# Patient Record
Sex: MALE | Race: WHITE
[De-identification: names, ages, dates, MRNs, and addresses within clinical notes are randomized per-mention and may not be internally consistent; named-entity substitution may affect disease eponyms.]

---

## 2017-05-20 ENCOUNTER — HOSPITAL ENCOUNTER (EMERGENCY)
Dept: HOSPITAL 18 - NAV ERS | Age: 52
Discharge: HOME | End: 2017-05-20
Payer: MEDICARE

## 2017-05-20 DIAGNOSIS — I50.9: ICD-10-CM

## 2017-05-20 DIAGNOSIS — F12.10: ICD-10-CM

## 2017-05-20 DIAGNOSIS — K57.90: ICD-10-CM

## 2017-05-20 DIAGNOSIS — Z86.19: ICD-10-CM

## 2017-05-20 DIAGNOSIS — E86.0: Primary | ICD-10-CM

## 2017-05-20 DIAGNOSIS — F15.10: ICD-10-CM

## 2017-05-20 DIAGNOSIS — F32.9: ICD-10-CM

## 2017-05-20 DIAGNOSIS — F41.9: ICD-10-CM

## 2017-05-20 DIAGNOSIS — I11.0: ICD-10-CM

## 2017-05-20 LAB
ALBUMIN SERPL BCG-MCNC: 3.6 G/DL (ref 3.5–5)
ALP SERPL-CCNC: 87 U/L (ref 40–150)
ALT SERPL W P-5'-P-CCNC: 66 U/L (ref 8–55)
ANION GAP SERPL CALC-SCNC: 16 MMOL/L (ref 10–20)
AST SERPL-CCNC: 101 U/L (ref 5–34)
BASOPHILS # BLD AUTO: 0.1 THOU/UL (ref 0–0.2)
BASOPHILS NFR BLD AUTO: 1.8 % (ref 0–1)
BILIRUB SERPL-MCNC: 1.9 MG/DL (ref 0.2–1.2)
BUN SERPL-MCNC: 23 MG/DL (ref 8.4–25.7)
CALCIUM SERPL-MCNC: 9.3 MG/DL (ref 7.8–10.44)
CHLORIDE SERPL-SCNC: 104 MMOL/L (ref 98–107)
CO2 SERPL-SCNC: 23 MMOL/L (ref 22–29)
CREAT CL PREDICTED SERPL C-G-VRATE: 0 ML/MIN (ref 70–130)
DRUG SCREEN CUTOFF: (no result)
EOSINOPHIL # BLD AUTO: 0.1 THOU/UL (ref 0–0.7)
EOSINOPHIL NFR BLD AUTO: 2 % (ref 0–10)
GLOBULIN SER CALC-MCNC: 3.6 G/DL (ref 2.4–3.5)
GLUCOSE SERPL-MCNC: 107 MG/DL (ref 70–105)
GLUCOSE UR STRIP-MCNC: 100 MG/DL
HGB BLD-MCNC: 19.2 G/DL (ref 14–18)
LYMPHOCYTES # BLD AUTO: 1.7 THOU/UL (ref 1.2–3.4)
LYMPHOCYTES NFR BLD AUTO: 24.1 % (ref 21–51)
MAGNESIUM SERPL-MCNC: 2 MG/DL (ref 1.6–2.6)
MCH RBC QN AUTO: 30.9 PG (ref 27–31)
MCV RBC AUTO: 92.7 FL (ref 80–94)
MEDTOX CONTROL LINE VALID?: (no result)
MONOCYTES # BLD AUTO: 0.6 THOU/UL (ref 0.11–0.59)
MONOCYTES NFR BLD AUTO: 8.7 % (ref 0–10)
NEUTROPHILS # BLD AUTO: 4.6 THOU/UL (ref 1.4–6.5)
NEUTROPHILS NFR BLD AUTO: 63.5 % (ref 42–75)
PLATELET # BLD AUTO: 114 THOU/UL (ref 130–400)
POTASSIUM SERPL-SCNC: 3.9 MMOL/L (ref 3.5–5.1)
PROT UR STRIP.AUTO-MCNC: 100 MG/DL
RBC # BLD AUTO: 6.23 MILL/UL (ref 4.7–6.1)
RBC UR QL AUTO: (no result) HPF (ref 0–3)
SODIUM SERPL-SCNC: 139 MMOL/L (ref 136–145)
SP GR UR STRIP: 1.02 (ref 1–1.03)
WBC # BLD AUTO: 7.2 THOU/UL (ref 4.8–10.8)
WBC UR QL AUTO: (no result) HPF (ref 0–3)

## 2017-05-20 PROCEDURE — 80053 COMPREHEN METABOLIC PANEL: CPT

## 2017-05-20 PROCEDURE — 81015 MICROSCOPIC EXAM OF URINE: CPT

## 2017-05-20 PROCEDURE — 71010: CPT

## 2017-05-20 PROCEDURE — 83880 ASSAY OF NATRIURETIC PEPTIDE: CPT

## 2017-05-20 PROCEDURE — 81003 URINALYSIS AUTO W/O SCOPE: CPT

## 2017-05-20 PROCEDURE — 36415 COLL VENOUS BLD VENIPUNCTURE: CPT

## 2017-05-20 PROCEDURE — 83735 ASSAY OF MAGNESIUM: CPT

## 2017-05-20 PROCEDURE — 80306 DRUG TEST PRSMV INSTRMNT: CPT

## 2017-05-20 PROCEDURE — 85025 COMPLETE CBC W/AUTO DIFF WBC: CPT

## 2017-05-20 NOTE — RAD
AP VIEW CHEST

5/20/17

 

HISTORY: 

Patient who is having lower extremity swelling. Off of blood pressure medication, evaluate cardiac a
nd vascular appearance.

 

AP view chest is obtained. The lungs are well aerated. Mild cardiomegaly seen. No evidence of effusi
ons, pneumonia, or pneumothorax seen. No evidence of pulmonary edema seen. 

 

IMPRESSION:  

Cardiomegaly, otherwise unremarkable AP view chest. 

 

POS: [facility]

## 2019-04-23 ENCOUNTER — HOSPITAL ENCOUNTER (INPATIENT)
Dept: HOSPITAL 92 - SCSER | Age: 54
LOS: 4 days | Discharge: HOME | DRG: 280 | End: 2019-04-27
Attending: INTERNAL MEDICINE | Admitting: INTERNAL MEDICINE
Payer: MEDICARE

## 2019-04-23 VITALS — BODY MASS INDEX: 23.8 KG/M2

## 2019-04-23 DIAGNOSIS — E87.6: ICD-10-CM

## 2019-04-23 DIAGNOSIS — I13.0: Primary | ICD-10-CM

## 2019-04-23 DIAGNOSIS — K80.20: ICD-10-CM

## 2019-04-23 DIAGNOSIS — I27.20: ICD-10-CM

## 2019-04-23 DIAGNOSIS — E87.2: ICD-10-CM

## 2019-04-23 DIAGNOSIS — N17.0: ICD-10-CM

## 2019-04-23 DIAGNOSIS — E80.6: ICD-10-CM

## 2019-04-23 DIAGNOSIS — I50.9: ICD-10-CM

## 2019-04-23 DIAGNOSIS — I21.A1: ICD-10-CM

## 2019-04-23 DIAGNOSIS — I27.81: ICD-10-CM

## 2019-04-23 DIAGNOSIS — G62.9: ICD-10-CM

## 2019-04-23 DIAGNOSIS — N18.2: ICD-10-CM

## 2019-04-23 DIAGNOSIS — B18.2: ICD-10-CM

## 2019-04-23 DIAGNOSIS — Z91.14: ICD-10-CM

## 2019-04-23 DIAGNOSIS — D69.6: ICD-10-CM

## 2019-04-23 LAB
ALBUMIN SERPL BCG-MCNC: 4.2 G/DL (ref 3.5–5)
ALP SERPL-CCNC: 75 U/L (ref 40–150)
ALT SERPL W P-5'-P-CCNC: 75 U/L (ref 8–55)
ANION GAP SERPL CALC-SCNC: 25 MMOL/L (ref 10–20)
AST SERPL-CCNC: 146 U/L (ref 5–34)
BASOPHILS # BLD AUTO: 0 THOU/UL (ref 0–0.2)
BASOPHILS NFR BLD AUTO: 0.5 % (ref 0–1)
BILIRUB SERPL-MCNC: 6.2 MG/DL (ref 0.2–1.2)
BUN SERPL-MCNC: 28 MG/DL (ref 8.4–25.7)
CALCIUM SERPL-MCNC: 10.4 MG/DL (ref 7.8–10.44)
CHLORIDE SERPL-SCNC: 106 MMOL/L (ref 98–107)
CK MB SERPL-MCNC: 36.4 NG/ML (ref 0–6.6)
CO2 SERPL-SCNC: 12 MMOL/L (ref 22–29)
CREAT CL PREDICTED SERPL C-G-VRATE: 0 ML/MIN (ref 70–130)
EOSINOPHIL # BLD AUTO: 0 THOU/UL (ref 0–0.7)
EOSINOPHIL NFR BLD AUTO: 0.2 % (ref 0–10)
GLOBULIN SER CALC-MCNC: 3.4 G/DL (ref 2.4–3.5)
GLUCOSE SERPL-MCNC: 53 MG/DL (ref 70–105)
HGB BLD-MCNC: 16.8 G/DL (ref 14–18)
LYMPHOCYTES # BLD: 0.9 THOU/UL (ref 1.2–3.4)
LYMPHOCYTES NFR BLD AUTO: 9.4 % (ref 21–51)
MAGNESIUM SERPL-MCNC: 1.4 MG/DL (ref 1.6–2.6)
MCH RBC QN AUTO: 28.6 PG (ref 27–31)
MCV RBC AUTO: 93.7 FL (ref 78–98)
MONOCYTES # BLD AUTO: 0.5 THOU/UL (ref 0.11–0.59)
MONOCYTES NFR BLD AUTO: 5.3 % (ref 0–10)
NEUTROPHILS # BLD AUTO: 8.1 THOU/UL (ref 1.4–6.5)
NEUTROPHILS NFR BLD AUTO: 84.6 % (ref 42–75)
PLATELET # BLD AUTO: 133 THOU/UL (ref 130–400)
POTASSIUM SERPL-SCNC: 4.2 MMOL/L (ref 3.5–5.1)
POTASSIUM SERPL-SCNC: 4.4 MMOL/L (ref 3.5–5.1)
RBC # BLD AUTO: 5.87 MILL/UL (ref 4.7–6.1)
SODIUM SERPL-SCNC: 139 MMOL/L (ref 136–145)
SP GR UR STRIP: 1.01 (ref 1–1.03)
TROPONIN I SERPL DL<=0.01 NG/ML-MCNC: 0.03 NG/ML (ref ?–0.03)
TROPONIN I SERPL DL<=0.01 NG/ML-MCNC: 0.05 NG/ML (ref ?–0.03)
WBC # BLD AUTO: 9.6 THOU/UL (ref 4.8–10.8)

## 2019-04-23 PROCEDURE — 82553 CREATINE MB FRACTION: CPT

## 2019-04-23 PROCEDURE — 36416 COLLJ CAPILLARY BLOOD SPEC: CPT

## 2019-04-23 PROCEDURE — 85025 COMPLETE CBC W/AUTO DIFF WBC: CPT

## 2019-04-23 PROCEDURE — 76705 ECHO EXAM OF ABDOMEN: CPT

## 2019-04-23 PROCEDURE — 83880 ASSAY OF NATRIURETIC PEPTIDE: CPT

## 2019-04-23 PROCEDURE — 81003 URINALYSIS AUTO W/O SCOPE: CPT

## 2019-04-23 PROCEDURE — 74177 CT ABD & PELVIS W/CONTRAST: CPT

## 2019-04-23 PROCEDURE — 83690 ASSAY OF LIPASE: CPT

## 2019-04-23 PROCEDURE — 83605 ASSAY OF LACTIC ACID: CPT

## 2019-04-23 PROCEDURE — A9537 TC99M MEBROFENIN: HCPCS

## 2019-04-23 PROCEDURE — 87804 INFLUENZA ASSAY W/OPTIC: CPT

## 2019-04-23 PROCEDURE — 84484 ASSAY OF TROPONIN QUANT: CPT

## 2019-04-23 PROCEDURE — 86803 HEPATITIS C AB TEST: CPT

## 2019-04-23 PROCEDURE — 87522 HEPATITIS C REVRS TRNSCRPJ: CPT

## 2019-04-23 PROCEDURE — 84145 PROCALCITONIN (PCT): CPT

## 2019-04-23 PROCEDURE — 36415 COLL VENOUS BLD VENIPUNCTURE: CPT

## 2019-04-23 PROCEDURE — 82010 KETONE BODYS QUAN: CPT

## 2019-04-23 PROCEDURE — 80048 BASIC METABOLIC PNL TOTAL CA: CPT

## 2019-04-23 PROCEDURE — 83735 ASSAY OF MAGNESIUM: CPT

## 2019-04-23 PROCEDURE — 93005 ELECTROCARDIOGRAM TRACING: CPT

## 2019-04-23 PROCEDURE — 96375 TX/PRO/DX INJ NEW DRUG ADDON: CPT

## 2019-04-23 PROCEDURE — 96367 TX/PROPH/DG ADDL SEQ IV INF: CPT

## 2019-04-23 PROCEDURE — 96365 THER/PROPH/DIAG IV INF INIT: CPT

## 2019-04-23 PROCEDURE — 93306 TTE W/DOPPLER COMPLETE: CPT

## 2019-04-23 PROCEDURE — 78227 HEPATOBIL SYST IMAGE W/DRUG: CPT

## 2019-04-23 PROCEDURE — 71045 X-RAY EXAM CHEST 1 VIEW: CPT

## 2019-04-23 PROCEDURE — 93798 PHYS/QHP OP CAR RHAB W/ECG: CPT

## 2019-04-23 PROCEDURE — 87086 URINE CULTURE/COLONY COUNT: CPT

## 2019-04-23 PROCEDURE — 80053 COMPREHEN METABOLIC PANEL: CPT

## 2019-04-23 PROCEDURE — 87040 BLOOD CULTURE FOR BACTERIA: CPT

## 2019-04-23 RX ADMIN — HYDROCODONE BITARTRATE AND ACETAMINOPHEN PRN TAB: 5; 325 TABLET ORAL at 23:39

## 2019-04-23 NOTE — RAD
Exam: Chest one view



HISTORY:Short of breath



Comparison: 5/20/2017



FINDINGS:



Lungs: No masses or consolidation.



Cardiac silhouette:Accentuated by portable technique

Pulmonary vessels: Normal

Pleural Spaces: Clear

Pneumothorax: None



Osseous abnormalities: None of acuity.



IMPRESSION: Prominent cardiac silhouette, accentuated by portable technique. Consider follow-up with 
dedicated 2 view chest.



Reported By: Jacinto Coleman 

Electronically Signed:  4/23/2019 4:07 PM

## 2019-04-23 NOTE — CT
CT OF THE ABDOMEN AND PELVIS WITH IV CONTRAST

4/23/19

 

INDICATION:

54-year-old male with generalized weakness and bodyaches with vomiting. The patient is having nausea,
 lightheadedness, abdominal pain and constipation for the past two days. The patient reports shortnes
s of breath. 

 

COMPARISON: 

Prior exam dated 10/8/11.

 

FINDINGS: 

The lung bases are clear. The heart size is moderately prominent. 

 

There is layering material within the gallbladder suspicious for sludge.  No focal hepatic lesion is 
evident. The pancreas and adrenal glands are within normal limits. The spleen is normal appearing. Th
e kidneys are normal appearing.

 

There are moderate calcifications involving the abdominal and pelvic vasculature. There is mild ascit
es. There is moderate anasarca. 

 

The bladder, rectum, and perirectal soft tissues are unremarkable. There is scattered diverticula pre
sent involving the colon without evidence of active  diverticulitis. There is some suggested wall thi
ckening involving portions of the ascending colon and cecum  which are nonspecific. The appendix is n
ot definitely seen; however, there is reports that the patient has undergone prior appendectomy. 

 

No acute osseous abnormality is evident. There is scattered degenerative and ossific change. 

 

IMPRESSION: 

1.      Interval development of mild ascites and diffuse anasarca. Findings may be related to CHF or 
volume overload. Findings also can be related to third spacing of unknown etiology.

 

2.      Suggested wall thickening involving portions of the cecum and ascending colon. Some of this m
ay be related to underdistention; however, a colitis of infectious or inflammatory etiology cannot be
 entirely excluded. 

 

3.      The extent of the haziness seen within the retroperitoneal and mesenteric soft tissues is fel
t to likely be related to the anasarca; however, entity such as pancreatitis cannot be entirely exclu
ded. 

 

4.      Moderate cardiomegaly.

 

POS: [facility]

## 2019-04-24 LAB
ANION GAP SERPL CALC-SCNC: 17 MMOL/L (ref 10–20)
BASOPHILS # BLD AUTO: 0.1 THOU/UL (ref 0–0.2)
BASOPHILS NFR BLD AUTO: 0.9 % (ref 0–1)
BUN SERPL-MCNC: 37 MG/DL (ref 8.4–25.7)
CALCIUM SERPL-MCNC: 9.2 MG/DL (ref 7.8–10.44)
CHLORIDE SERPL-SCNC: 106 MMOL/L (ref 98–107)
CO2 SERPL-SCNC: 16 MMOL/L (ref 22–29)
CREAT CL PREDICTED SERPL C-G-VRATE: 69 ML/MIN (ref 70–130)
EOSINOPHIL # BLD AUTO: 0.1 THOU/UL (ref 0–0.7)
EOSINOPHIL NFR BLD AUTO: 0.7 % (ref 0–10)
GLUCOSE SERPL-MCNC: 160 MG/DL (ref 70–105)
HCV AB SERPL QL IA: (no result)
HEP C INDEX: 13.25 S/CO (ref 0–0.79)
HGB BLD-MCNC: 16.2 G/DL (ref 14–18)
LYMPHOCYTES # BLD: 1.8 THOU/UL (ref 1.2–3.4)
LYMPHOCYTES NFR BLD AUTO: 17.5 % (ref 21–51)
MCH RBC QN AUTO: 30.3 PG (ref 27–31)
MCV RBC AUTO: 95.2 FL (ref 78–98)
MONOCYTES # BLD AUTO: 1.1 THOU/UL (ref 0.11–0.59)
MONOCYTES NFR BLD AUTO: 10.6 % (ref 0–10)
NEUTROPHILS # BLD AUTO: 7.2 THOU/UL (ref 1.4–6.5)
NEUTROPHILS NFR BLD AUTO: 70.3 % (ref 42–75)
PLATELET # BLD AUTO: 127 THOU/UL (ref 130–400)
POTASSIUM SERPL-SCNC: 4.2 MMOL/L (ref 3.5–5.1)
RBC # BLD AUTO: 5.36 MILL/UL (ref 4.7–6.1)
SODIUM SERPL-SCNC: 135 MMOL/L (ref 136–145)
WBC # BLD AUTO: 10.3 THOU/UL (ref 4.8–10.8)

## 2019-04-24 RX ADMIN — SODIUM BICARBONATE SCH MG: 325 TABLET ORAL at 15:29

## 2019-04-24 RX ADMIN — Medication SCH ML: at 20:03

## 2019-04-24 RX ADMIN — CEFTRIAXONE SCH MLS: 1 INJECTION, POWDER, FOR SOLUTION INTRAMUSCULAR; INTRAVENOUS at 12:55

## 2019-04-24 RX ADMIN — SODIUM BICARBONATE SCH MG: 325 TABLET ORAL at 20:02

## 2019-04-24 RX ADMIN — METRONIDAZOLE SCH MLS: 500 INJECTION, SOLUTION INTRAVENOUS at 14:48

## 2019-04-24 RX ADMIN — Medication SCH ML: at 09:49

## 2019-04-24 RX ADMIN — ASPIRIN 81 MG CHEWABLE TABLET SCH MG: 81 TABLET CHEWABLE at 09:47

## 2019-04-24 RX ADMIN — SODIUM BICARBONATE SCH MG: 325 TABLET ORAL at 09:50

## 2019-04-24 RX ADMIN — METRONIDAZOLE SCH MLS: 500 INJECTION, SOLUTION INTRAVENOUS at 20:42

## 2019-04-24 NOTE — CON
DATE OF CONSULTATION:  04/24/2019



REFERRING PHYSICIAN:  Zulma Pierson MD



SURGEON:  Lex Martinez DO



HISTORY OF PRESENT ILLNESS:  The patient is a 54-year-old male, who presented to the

emergency department yesterday complaining of weakness and nausea and vomiting for

several days.  He has a significant past medical history and reported he ran out of

all of his medications and so he stopped using them.  He has a history of hepatitis

C, renal failure, GERD, diverticulosis, CHF, left lower extremity ulcer,

polyneuropathy, history of suicidal ideations, suicide history, and depression.  He

does also report meth and marijuana use frequently.  On his evaluation in the

emergency department, he received a CT of the abdomen and pelvis, which demonstrated

common bile duct size of 0.5 cm, right upper quadrant free fluid and biliary

sludging gallstones with no obstruction.  This prompted a surgical consultation by

the patient's primary team.  The patient reports to surgery that he has been having

nausea and vomiting for the past two days.  Emesis is clear.  Last bowel movement

was 2 days ago.  He is still passing flatus.  Minimal abdominal pain, which is not

exacerbated by eating.  He also has very mild right upper quadrant pain.  LFTs are

only slightly elevated.  The patient reports that he generally does not feel well. 



REVIEW OF SYSTEMS:  All additional review of systems negative except as indicated

above. 



PAST MEDICAL HISTORY:  Hepatitis C, renal failure, GERD, diverticulosis, CHF, left

lower extremity ulcer, polyneuropathy, suicidal history, depression, polysubstance

abuse, chronic traumatic wound to right lower extremity. 



PAST SURGICAL HISTORY:  Appendectomy.



SOCIAL HISTORY:  The patient reports marijuana, meth, alcohol, benzo and cocaine

abuse. 



MEDICATIONS:  Lasix, lisinopril, and gabapentin.



ALLERGIES:  NO KNOWN DRUG ALLERGIES.



PHYSICAL EXAMINATION:

VITAL SIGNS:  Temperature 97.4, pulse 98, respirations are 17, oxygen saturation 96%

on 1 L nasal cannula, and blood pressure 138/89. 

GENERAL:  Ill-appearing thin male, sitting up in bed with moderate distress. 

NEUROLOGIC:  GCS is 15.  Alert and oriented x3.  Gross motor and sensation intact. 

HEENT:  Pupils are equal, round, reactive to light. 

PULMONARY:  No signs of acute respiratory distress.  Equal chest rise and fall.

Lung fields clear bilaterally. 

HEART:  Regular rate and rhythm.  No murmurs, gallops, or rubs. 

GI:  Abdomen is soft with minimal tenderness to the right upper quadrant,

nondistended. 

EXTREMITIES:  Gross motor and sensation intact.  2+ pulses in all extremities.

Swelling to bilateral lower extremities, left greater than right.  Chronic wound to

left anterior tib-fib with dressing in place.  No purulent discharge noted. 



LABORATORY FINDINGS:  White count 10.3, hemoglobin 16.2, hematocrit 51.0, and

platelets are 127.  Sodium 135, potassium 4.2, chloride 106, carbon dioxide 16, BUN

37, creatinine 1.51, glucose 160, lactic acid 5.3. 



DIAGNOSTIC FINDINGS:  CT scan of the abdomen and pelvis demonstrates a common bile

duct measuring 0.5 cm.  Right upper quadrant free fluid.  Abdominal ultrasound

demonstrates biliary sludge and gallstones.  No evidence of acute biliary

obstruction.  Small amount of free fluid within the right upper quadrant. 



ASSESSMENT:  Right upper quadrant abdominal pain, may be due to acute cholecystitis.



PLAN:  The patient will receive a HIDA scan tomorrow.  We will make the patient

n.p.o. for midnight and will reassess the indication for surgery after the HIDA scan

is complete.  No acute surgical intervention today.  Pain management and other acute

medical management by primary team.  The patient was seen and examined by Dr. Martinez

and myself this afternoon. 





Job ID:  [id number]

## 2019-04-24 NOTE — HP
PRIMARY CARE DOCTOR:  None reported.



CODE STATUS:  Full code.



TIME OF EVALUATION:  10:00 p.m.



CHIEF COMPLAINT:  Generalized weakness.



HISTORY OF PRESENT ILLNESS:  A 54-year-old male patient with past medical 
history of

hepatitis C, kidney failure, GERD, diverticulosis, and congestive heart failure,

came to the hospital after having severe and gradually worsening generalized

weakness with no clear triggers. No alleviating factors. The symptoms started 
since

yesterday. The patient also has associated nausea and vomiting, for the past 2 
days,

feeling lightheaded with abdominal discomfort. As noted, the patient has 
bilateral

lower extremity polyneuropathy, and also with chronic trophic changes and 
healing

ulcer in the left leg that has been followed by Wound Care for the past few 
months. 



REVIEW OF SYSTEMS:  CONSTITUTIONAL: No fever or chills. The patient reported

generalized weakness. 

RESPIRATORY: No cough or sputum production. He reported no shortness of breath. 

CARDIOVASCULAR: No chest pain or palpitations. 

GASTROINTESTINAL: The patient had nausea and vomiting. Question of abdominal 
pain

and diarrhea. 

CNS: No dizziness. The patient was feeling lightheaded. 

GENITOURINARY: No burning on urination. 

EXTREMITIES: No leg swelling. 



All other systems were reviewed and negative except for the findings mentioned 
above.



PAST MEDICAL HISTORY:  As mentioned in the HPI.



PAST SURGICAL HISTORY:  Orthopedic surgery, right foot surgery, tonsillectomy,

appendectomy, cardiac cath in 2014. 



FAMILY HISTORY:Reviewed and non contributory to current presentation. 



PSYCHIATRIC HISTORY:  The patient has history of anxiety, depression, suicidal

attempt, and overdose with antifreeze. 



SOCIAL HISTORY:  The patient uses marijuana, methamphetamines, alcohol, former 
use

of benzodiazepine, and cocaine. 



KNOWN ALLERGIES:  No known drug allergies.



REPORTED MEDICATIONS:  

1. Lasix.

2. Lisinopril.

3. Gabapentin.



PHYSICAL EXAMINATION:

VITAL SIGNS: On presentation, blood pressure 148/101 with heart rate 110,

respiratory rate was 20, temperature 97.6, pain was 10/10, and oxygen 
saturation is

97% on room air. 

GENERAL APPEARANCE: The patient is alert, oriented, not in acute distress. 

HEENT: Eyes, normal conjunctivae. Moist oral mucosa. Anicteric. 

NECK: No JVD. 

RESPIRATORY: Bilateral air entry. No rales. No wheezing. Symmetric expansion. 

CARDIOVASCULAR: Normal rate. Regular rhythm. No murmurs. No rub. The patient has

bilateral leg edema. 

ABDOMEN: Soft. Normal bowel sounds. 

MUSCULOSKELETAL: Baseline range of motion. No sternal tenderness. 

SKIN: Warm, intact. No pallor. No rash. No redness except for bilateral lower

extremities, that are red with significant trophic changes. There is a healing 
ulcer

on the left leg of about 1 x 1 inches, very poor hygiene. Peripheral pulses are

present. Capillary refill seems to be intact. 

NEURO: No evidence of any new focal weakness. Baseline speech. Cranial nerves 
seem

to be intact. 

PSYCH: The patient is in good mood. No anxiety. Optimal judgment.



LABORATORY DATA:  EKG was reviewed. The patient has sinus tachycardia at the 
rate of

109, QT corrected 511. No other significant abnormalities. CT abdomen and pelvis
;

the patient has mild ascites with anasarca with thickening of the cecum and

ascending colon suspicious for colitis. Labs are reviewed. The patient has white

count of 9.6, hemoglobin 16.8, MCV 93.7 



ASSESSMENT AND PLAN:  

1. He has possible colitis as seen on the CT. The patient has been started on

antibiotics. We will continue for now, follow stool cultures and adjust 
treatment

accordingly. 

2. He has underlying congestive heart failure, continue diuresis, reconcile home

medications. 

3. Chronic nonhealing wound on the left neck, continue wound care as inpatient.

4. The patient has uncontrolled hypertension with systolic blood pressure 146. 
We

will not treat aggressively as the patient presented with some diarrhea and 
colitis,

we will monitor. We will adjust treatment as needed. 

5. Lactic acidosis on presentation, trending down from 7.0 to 5.3 likely 
secondary

to the congestive heart failure or acute colitis, treatment as above. 

6. Mildly elevated troponin likely secondary to underlying congestive heart 
failure,

initial troponin 0.033. Seconds one 0.047, we will trend and monitor, 
Cardiology has

been consulted. We will follow recommendations. 

7. Acute kidney injury. The patient has an increase in creatinine from 1.0-1.4,

unclear etiology, could be cardiorenal. The patient has elevated BNP. 
Nephrology has

been consulted. We will follow recommendations. 







Job ID:  [id number]



[facility]
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
## 2019-04-24 NOTE — CON
DATE OF CONSULTATION:  



REASON FOR CONSULTATION:  Elevated creatinine.



HISTORY OF PRESENT ILLNESS:  A very pleasant 54-year-old gentleman, presented to the

hospital for generalized weakness.  The patient had colitis.  His baseline

creatinine was 1, which increased to 1.5, so I was consulted.  The patient has had

no proteinuria.  The patient denies no headache, numbness, tingling, or weakness.

Denies any nausea, vomiting, or chest pain. 



PAST MEDICAL HISTORY:  Significant for hepatitis C, kidney disease, diverticulitis,

and congestive heart failure. 



PAST SURGICAL HISTORY:  Significant for orthopedic surgery, right foot surgery,

tonsillectomy, appendectomy, and cardiac cath in 2014. 



SOCIAL HISTORY:  The patient he does use drugs formally.



ALLERGIES:  NO KNOWN DRUG ALLERGIES.



MEDICATIONS:  Home medications list reviewed.  Hospital medications list reviewed.



PHYSICAL EXAMINATION:

CONSTITUTIONAL:  The patient is awake and alert. 

VITAL SIGNS:  Pulse 110, breathing 16, and blood pressure 148/60. 

GENERAL APPEARANCE AND MENTAL STATUS:  Fair. 

HEAD/NECK:  Normocephalic.  Atraumatic. 

EYES:  EOMI.  No deformity. 

EARS:  Clear.  No ulcers. 

NOSE:  Intact.  No lesions. 

MOUTH:  Clear.  No discharge. 

THROAT:  Clear.  No exudate. 

LUNGS:  Clear.  No crackles. 

CARDIAC:  S1, S2.  No rub. 

ABDOMEN:  Benign.  Bowel sounds positive. 

GENITALIA/RECTUM:  Crawley absent. 

BACK/EXTREMITIES:  Edema 0+. 

NEUROLOGICAL:  Alert and motor intact. 

SKIN:   

LYMPHATICS:



REVIEW OF SYSTEMS:  A 15-point review of system was performed, negative except as

noted above. 

GENERAL:   

HEAD:   

NECK:  No swelling or lumps. 

NOSE:  No epistaxis or discharge.   

EYES:  No diplopia or pain. 

RESPIRATORY:   

CARDIOVASCULAR:   

GASTROINTESTINAL:   

/GYN:   

MUSCULOSKELETAL:  No joint pain. 

NEUROPSYCHIATIC SYSTEMS:  No suicidal ideation.  No ideation. 

SKIN:  Denies any rash or ulcer. 

CONSTITUTIONAL:   No fever or chills.



LABORATORY DATA:  Labs reviewed.



ASSESSMENT AND PLAN:  Acute kidney injury with chronic kidney disease most likely

due to acute tubular necrosis due to decreased effective arterial blood volume.

Continue hydration.  Hypertension, stable.  Anemia, stable.  Medication based on GFR

appropriate.  No indication for dialysis. 







Job ID:  [id number]

## 2019-04-24 NOTE — ULT
Sonogram right upper quadrant



HISTORY: Right quadrant pain.



COMPARISON: CT abdomen from 4/23/2019.



FINDINGS: Echogenic sludge and small stones are present within the dependent portion of the gallbladd
er lumen. Patient was reportedly not tender over the gallbladder fossa at the time of the exam.

Common duct is 0.5 cm. There is unremarkable without focal mass or intrahepatic biliary dilatation. S
mall amount of free fluid within the right upper quadrant.







IMPRESSION: Biliary sludge and gallstones. No evidence of acute biliary obstruction.



Small amount of free fluid within the right upper quadrant.



Reported By: EMILY Madison 

Electronically Signed:  4/24/2019 12:26 PM

## 2019-04-24 NOTE — CON
DATE OF CONSULTATION:  04/24/2019



REASON FOR CONSULTATION:  Elevated troponin.



HISTORY OF PRESENT ILLNESS:  Mr. Cabral is an unfortunate 54-year-old gentleman with

previous history of substance abuse, hepatitis C, who recently complained of

generalized weakness.  He was found to have elevated creatinine in addition to a

nonhealing ulcer.  His troponin was minimally elevated, therefore prompting

consultation.  No chest pain or pressure noted.  During my visit, he could not be

specific to his weakness and fatigue. 



He underwent coronary angiography by Dr. Philip Stoddard in 2014 and had no significant

coronary artery disease present. 



PAST MEDICAL HISTORY:  As above.



PAST SURGICAL HISTORY:  Right foot surgery, orthopedic surgery, tonsillectomy,

appendectomy. 



SOCIAL HISTORY:  The patient previously used marijuana, methamphetamines, alcohol,

and cocaine. 



ALLERGIES:  NONE.



REVIEW OF SYSTEMS:  A 10-point review of systems is reviewed and as above, otherwise

negative. 



PHYSICAL EXAMINATION:

GENERAL:  Patient is a pleasant male, who is in no acute distress.  The patient does

appear disheveled and older than stated age. 

VITAL SIGNS:  Blood pressure 111/72, pulse 84, temperature 97.4. 

NEUROLOGIC:  The patient is alert and oriented x3 with no focal neurologic deficits. 

HEENT:  Sclerae without icterus.  Mouth has moist mucous membranes with normal

pallor. 

NECK:  No JVD.  Carotid upstroke brisk.  No bruits bilaterally. 

LUNGS:  Clear to auscultation with unlabored respirations. 

BACK:  No scoliosis or kyphosis. 

CARDIAC:  Regular rate and rhythm with normal S1 and S2.  No S3 or S4 noted.  No

significant rubs, murmurs, thrills, or gallops noted throughout the precordium.  PMI

is not displaced.  There is no parasternal heave. 

ABDOMEN:  Soft, nontender, nondistended.  No peritoneal signs present.  No

hepatosplenomegaly.  No abnormal striae. 

EXTREMITIES:  2+ femoral and 2+ dorsalis pedis pulses.  No cyanosis, clubbing, or

edema. 

SKIN:  No gross abnormalities.



PERTINENT LABORATORY DATA:  Hemoglobin 16.2, hematocrit 51.  Creatinine 1.51 with a

GFR 48.  Lactic acid level 5.3.  Magnesium level 1.4.  Peak troponin 0.047. 



IMPRESSION:  

1. Elevated troponin.

2. Weakness.

3. Chronic kidney disease.

4. Substance abuse.



RECOMMENDATIONS:  His most recent echo showed normal LVEF.  He has a markedly

elevated right ventricle in addition to severe tricuspid regurgitation.  When

compared to previous echo, this appeared unchanged.  This was in 2017.  His elevated

troponin likely multifactorial and represents type 2 MI, not type 1.  He did have

lactic acid level, which is elevated in addition to elevated creatinine.  His

overall LVEF is normal.  He has no current symptoms suggesting angina.  I would

recommend conservative therapy from CV standpoint.  Otherwise, I have no further

recommendations.  Please re-consult if other changes occur. 







Job ID:  [id number]

## 2019-04-25 LAB
ALBUMIN SERPL BCG-MCNC: 3.3 G/DL (ref 3.5–5)
ALP SERPL-CCNC: 98 U/L (ref 40–150)
ALT SERPL W P-5'-P-CCNC: 138 U/L (ref 8–55)
ANION GAP SERPL CALC-SCNC: 14 MMOL/L (ref 10–20)
ANION GAP SERPL CALC-SCNC: 14 MMOL/L (ref 10–20)
AST SERPL-CCNC: 221 U/L (ref 5–34)
BASOPHILS # BLD AUTO: 0.1 THOU/UL (ref 0–0.2)
BASOPHILS NFR BLD AUTO: 1.1 % (ref 0–1)
BILIRUB SERPL-MCNC: 2.6 MG/DL (ref 0.2–1.2)
BUN SERPL-MCNC: 33 MG/DL (ref 8.4–25.7)
BUN SERPL-MCNC: 37 MG/DL (ref 8.4–25.7)
CALCIUM SERPL-MCNC: 9 MG/DL (ref 7.8–10.44)
CALCIUM SERPL-MCNC: 9.1 MG/DL (ref 7.8–10.44)
CHLORIDE SERPL-SCNC: 100 MMOL/L (ref 98–107)
CHLORIDE SERPL-SCNC: 101 MMOL/L (ref 98–107)
CO2 SERPL-SCNC: 25 MMOL/L (ref 22–29)
CO2 SERPL-SCNC: 28 MMOL/L (ref 22–29)
CREAT CL PREDICTED SERPL C-G-VRATE: 75 ML/MIN (ref 70–130)
CREAT CL PREDICTED SERPL C-G-VRATE: 76 ML/MIN (ref 70–130)
EOSINOPHIL # BLD AUTO: 0.2 THOU/UL (ref 0–0.7)
EOSINOPHIL NFR BLD AUTO: 2.3 % (ref 0–10)
GLOBULIN SER CALC-MCNC: 2.7 G/DL (ref 2.4–3.5)
GLUCOSE SERPL-MCNC: 122 MG/DL (ref 70–105)
GLUCOSE SERPL-MCNC: 99 MG/DL (ref 70–105)
HGB BLD-MCNC: 15.6 G/DL (ref 14–18)
LYMPHOCYTES # BLD: 1.6 THOU/UL (ref 1.2–3.4)
LYMPHOCYTES NFR BLD AUTO: 20.1 % (ref 21–51)
MCH RBC QN AUTO: 29.9 PG (ref 27–31)
MCV RBC AUTO: 93.4 FL (ref 78–98)
MONOCYTES # BLD AUTO: 0.7 THOU/UL (ref 0.11–0.59)
MONOCYTES NFR BLD AUTO: 9 % (ref 0–10)
NEUTROPHILS # BLD AUTO: 5.2 THOU/UL (ref 1.4–6.5)
NEUTROPHILS NFR BLD AUTO: 67.5 % (ref 42–75)
PLATELET # BLD AUTO: 108 THOU/UL (ref 130–400)
POTASSIUM SERPL-SCNC: 2.8 MMOL/L (ref 3.5–5.1)
POTASSIUM SERPL-SCNC: 3.1 MMOL/L (ref 3.5–5.1)
POTASSIUM SERPL-SCNC: 3.1 MMOL/L (ref 3.5–5.1)
RBC # BLD AUTO: 5.2 MILL/UL (ref 4.7–6.1)
SODIUM SERPL-SCNC: 137 MMOL/L (ref 136–145)
SODIUM SERPL-SCNC: 139 MMOL/L (ref 136–145)
WBC # BLD AUTO: 7.7 THOU/UL (ref 4.8–10.8)

## 2019-04-25 RX ADMIN — METRONIDAZOLE SCH MLS: 500 INJECTION, SOLUTION INTRAVENOUS at 05:31

## 2019-04-25 RX ADMIN — HYDROCODONE BITARTRATE AND ACETAMINOPHEN PRN TAB: 5; 325 TABLET ORAL at 20:18

## 2019-04-25 RX ADMIN — HYDROCODONE BITARTRATE AND ACETAMINOPHEN PRN TAB: 5; 325 TABLET ORAL at 05:34

## 2019-04-25 RX ADMIN — SODIUM BICARBONATE SCH MG: 325 TABLET ORAL at 14:53

## 2019-04-25 RX ADMIN — POTASSIUM CHLORIDE SCH: 14.9 INJECTION, SOLUTION INTRAVENOUS at 13:34

## 2019-04-25 RX ADMIN — SODIUM BICARBONATE SCH MG: 325 TABLET ORAL at 20:18

## 2019-04-25 RX ADMIN — ASPIRIN 81 MG CHEWABLE TABLET SCH MG: 81 TABLET CHEWABLE at 13:28

## 2019-04-25 RX ADMIN — Medication SCH ML: at 08:40

## 2019-04-25 RX ADMIN — POTASSIUM CHLORIDE SCH MLS: 14.9 INJECTION, SOLUTION INTRAVENOUS at 08:33

## 2019-04-25 RX ADMIN — METRONIDAZOLE SCH MLS: 500 INJECTION, SOLUTION INTRAVENOUS at 14:53

## 2019-04-25 RX ADMIN — SODIUM BICARBONATE SCH MG: 325 TABLET ORAL at 13:29

## 2019-04-25 RX ADMIN — CEFTRIAXONE SCH MLS: 1 INJECTION, POWDER, FOR SOLUTION INTRAMUSCULAR; INTRAVENOUS at 05:31

## 2019-04-25 RX ADMIN — Medication SCH ML: at 20:18

## 2019-04-25 RX ADMIN — METRONIDAZOLE SCH MLS: 500 INJECTION, SOLUTION INTRAVENOUS at 21:36

## 2019-04-25 NOTE — PRG
DATE OF SERVICE:  04/25/2019



SUBJECTIVE:  A 54-year-old gentleman being seen for acute kidney injury.  The

patient denied nausea, vomiting, or chest pain. 



OBJECTIVE:  CONSTITUTIONAL:  The patient is awake and alert. 

VITAL SIGNS:  Afebrile.  Pulse 98, breathing 16, and blood pressure 129/78. 

GENERAL APPEARANCE AND MENTAL STATUS:  Fair. 

HEAD/NECK:  Normocephalic.  Atraumatic. 

EYES:  EOMI.  No deformity. 

EARS:  Clear.  No ulcers. 

NOSE:  Intact.  No lesions. 

MOUTH:  Clear.  No discharge. 

THROAT:  Clear.  No exudate. 

LUNGS:  Clear.  No crackles. 

CARDIAC:  S1, S2.  No rub. 

ABDOMEN:  Benign.  Bowel sounds positive. 

GENITALIA/RECTUM:  Crawley absent. 

BACK/EXTREMITIES:  Edema 0+. 

NEUROLOGICAL:  Alert and motor intact. 

SKIN:   

LYMPHATICS:



LABORATORY DATA:  __________ potassium 2.8 and creatinine 1.3.



ASSESSMENT AND PLAN:  __________ chronic kidney disease stage 3 stable.  Acute

kidney injury was due to acute tubular necrosis.  Hypokalemia, recommend 40 mEq of

potassium.  Recheck potassium and magnesium. 







Job ID:  [id number]

## 2019-04-25 NOTE — NM
HEPATOBILIARY SCAN:



HISTORY:Right upper quadrant pain



RADIOPHARMACEUTICAL: 5 mCi Technetium 99m Mebrofenin injected intravenously



FINDINGS:

There is normal tracer extraction by the liver with normal excretion into the biliary tracts and smal
l bowel loops and normal filling of the gallbladder.



The calculated gallbladder ejection fraction following an oral fatty meal measures 74%.



IMPRESSION:Normal exam.



Reported By: Griffin Gary 

Electronically Signed:  4/25/2019 12:59 PM

## 2019-04-25 NOTE — PRG
DATE OF SERVICE:  04/25/2019



SUBJECTIVE:  The patient was seen this morning, sitting up in bed.  Appearing 
better

than yesterday.  Reported no nausea or vomiting overnight.  Just stated that 
his IV

potassium replacement was painful in his right upper extremity.  Has still not 
had a

bowel movement.  Not passing flatus, however, has minimal GI complaints. 



OBJECTIVE:  VITAL SIGNS:  Temperature 98.1, pulse 100, respirations 21, oxygen

saturation 92% on room air, and blood pressure 129/76. 

GENERAL:  A well-appearing thin male, sitting up in bed with no signs of acute

distress. 

NEUROLOGIC:  GCS is 15.  Alert and oriented x3.  Gross motor and sensation are

intact. 

PULMONARY:  No signs of acute respiratory distress.  Equal chest rise and fall.

Clear breath sounds bilaterally. 

HEART:  Regular rate and rhythm.  No murmurs, gallops, or rubs. 

GI:  Abdomen is soft.  Minimal tenderness to the right upper quadrant, but

nondistended. 

EXTREMITIES:  Gross motor and sensation intact in all extremities.  2+ pulses 
in all

extremities.  Swelling in his bilateral lower extremities, left greater than 
right.

Chronic wound to left anterior tib-fib in place. 



LABORATORY FINDINGS:  White count 7.7, hemoglobin 15.6, hematocrit 48.6, 
platelets

108.  Sodium 139, potassium 3.1, chloride 101, carbon dioxide 28, BUN 33, 
creatinine

1.32, and glucose 99. 



DIAGNOSTIC FINDINGS:  HIDA scan completed today, demonstrates there is normal 
tracer

extraction by the liver with normal extraction into the biliary tract and small

bowel loops and normal filling of the gallbladder.  Ejection fraction

following in the oral fatty meal measures 78%.  Impression, reports normal 
exam. 



ASSESSMENT:  Mild abdominal symptoms, rule out acute cholecystitis and

cholelithiasis. 



PLAN:  The patient received this HIDA scan today, which demonstrated the patient

does not have acute cholecystitis.  There is no surgical indication at this 
time.

No additional workup indicated per surgery.  Pain management and additional 
acute medical

management by primary team.  Surgery will sign off at this time.  Please call 
with

any concerns.  The patient was discussed with Dr. Martinez before this dictation. 







Job ID:  [id number]



MTDD

## 2019-04-25 NOTE — PDOC.PN
- Subjective


Encounter Start Date: 04/25/19


Encounter Start Time: 14:33


Subjective: feels much better.still feels weak though.


-: c/o pain in legs and leg wound weeping





- Objective


Resuscitation Status - Order Detail:





04/23/19 22:20


Resuscitation Status Routine 


   Resuscitation Status: FULL: Full Resuscitation








MAR Reviewed: Yes


Vital Signs & Weight: 


 Vital Signs (12 hours)











  Temp Pulse Resp BP BP Pulse Ox


 


 04/25/19 12:54  96.1 F L  98  20  128/83   98


 


 04/25/19 07:40  98.8 F  98  18  129/78   99


 


 04/25/19 04:00  98.1 F  100  21 H   129/75  92 L








 Weight











Admit Weight                   193 lb 7 oz


 


Weight                         185 lb














I&O: 


 











 04/24/19 04/25/19 04/26/19





 06:59 06:59 06:59


 


Intake Total 500 1980 


 


Output Total 600 3500 


 


Balance -100 -1520 











Result Diagrams: 


 04/25/19 06:05





 04/25/19 06:05


Additional Labs: 


 Accuchecks











  04/25/19 04/24/19 04/24/19





  05:29 19:55 17:44


 


POC Glucose  122 H  120 H  107








Microbiology





04/23/19 16:35   Nasal swab   Influenza Types A,B Direct EIA - Final


04/24/19 15:32   Venous blood - Right Hand   Blood Culture - Preliminary


                              Specimen has been received and culture in 

progress.


                              No Growth to date.


04/24/19 15:26   Venous blood - Left Hand   Blood Culture - Preliminary


                              Specimen has been received and culture in 

progress.


                              No Growth to date.


04/23/19 18:40   Urine voided   Urine Culture - Preliminary


                              NO GROWTH AT 12 HOURS





 Laboratory Tests











  05/20/17 04/23/19 04/23/19





  16:15 16:30 16:35


 


Plt Count    133


 


Creatinine   


 


Lactic Acid   7.0 H* 


 


Magnesium   


 


ALT  66 H  


 


Total Bilirubin   


 


AST  101 H  


 


B-Natriuretic Peptide   


 


Hepatitis C Antibody   














  04/23/19 04/23/19 04/23/19





  16:35 16:35 22:17


 


Plt Count   


 


Creatinine   1.47 H 


 


Lactic Acid    5.5 H*


 


Magnesium   


 


ALT   75 H 


 


Total Bilirubin   6.2 H 


 


AST   146 H 


 


B-Natriuretic Peptide  1112.2 H  


 


Hepatitis C Antibody   














  04/23/19 04/24/19 04/24/19





  23:30 05:16 05:16


 


Plt Count    127 L


 


Creatinine   1.51 H 


 


Lactic Acid  5.3 H*  


 


Magnesium   


 


ALT   


 


Total Bilirubin   


 


AST   


 


B-Natriuretic Peptide   


 


Hepatitis C Antibody   














  04/24/19 04/25/19 04/25/19





  15:32 06:05 06:05


 


Plt Count   


 


Creatinine   1.30 


 


Lactic Acid   


 


Magnesium   


 


ALT   138 H 


 


Total Bilirubin   2.6 H 


 


AST   221 H 


 


B-Natriuretic Peptide    1227.4 H


 


Hepatitis C Antibody  Reflex HepC Qnt H  














  04/25/19 04/25/19 04/25/19





  06:05 06:05 06:05


 


Plt Count   108 L 


 


Creatinine   


 


Lactic Acid  1.9  


 


Magnesium    1.6


 


ALT   


 


Total Bilirubin   


 


AST   


 


B-Natriuretic Peptide   


 


Hepatitis C Antibody   














Phys Exam





- Physical Examination


Constitutional: NAD


HEENT: PERRLA, moist MMs, sclera anicteric, oral pharynx no lesions


Neck: no nodes, no JVD, supple, full ROM


Respiratory: no wheezing, no rales, no rhonchi, clear to auscultation bilateral


Cardiovascular: RRR, no significant murmur, no rub


Gastrointestinal: soft, non-tender, no distention, positive bowel sounds


Musculoskeletal: no edema, pulses present


Neurological: non-focal, normal sensation, moves all 4 limbs


Psychiatric: normal affect, A&O x 3


Skin: no rash





Dx/Plan


(1) Acute right-sided CHF (congestive heart failure)


Code(s): I50.9 - HEART FAILURE, UNSPECIFIED   Status: Acute   Comment: H/O same 

.Pulmonary HTN per ECHO 2016.cont lasix.monitor I/Os.   





(2) BEVERLY (acute kidney injury)


Code(s): N17.9 - ACUTE KIDNEY FAILURE, UNSPECIFIED   Status: Acute   Comment: 

improving.   





(3) Hypokalemia


Code(s): E87.6 - HYPOKALEMIA   Status: Acute   Comment: replace and recheck   





(4) Lactic acid acidosis


Code(s): E87.2 - ACIDOSIS   Status: Acute   Comment: Multifactorial.Due to BEVERLY,

dehydration due to Poor PO intake.improving. monitor. empiric ABX. Follow Stool 

studies/Cx.Add CDiff testing


improving.monitor   





(5) Metabolic acidosis


Code(s): E87.2 - ACIDOSIS   Status: Acute   Comment: due to high lactic acid 

and BEVERLY   





(6) Hyperbilirubinemia


Code(s): E80.6 - OTHER DISORDERS OF BILIRUBIN METABOLISM   Status: Acute   

Comment: no Obstruction or cholecystitis on RUQ -US.GS consulted. HIDA done . 

results pending.Empiric ABx   





(7) Gallstone


Code(s): K80.20 - CALCULUS OF GALLBLADDER W/O CHOLECYSTITIS W/O OBSTRUCTION   

Status: Chronic   Comment: HIDA pending   





(8) Pulmonary hypertension


Code(s): I27.2 - OTHER SECONDARY PULMONARY HYPERTENSION * DO NOT USE *   Status

: Chronic   Comment: Repeat ECHO this admission nancy eas before w severe TR and 

Pulm HTN   





(9) Cor pulmonale


Code(s): I27.81 - COR PULMONALE (CHRONIC)   Status: Chronic   





(10) Hypertension


Code(s): I10 - ESSENTIAL (PRIMARY) HYPERTENSION   Status: Chronic   





(11) Hep C w/o coma, chronic


Code(s): B18.2 - CHRONIC VIRAL HEPATITIS C   Status: Chronic   Comment: no Rx 

so far. Unknown status. Will  check Ab and PCR for Ag. Alpaley Chronic.   





(12) Peripheral neuropathy


Code(s): G62.9 - POLYNEUROPATHY, UNSPECIFIED   Status: Chronic   Comment: start 

gabapentin.   





(13) Thrombocytopenia


Code(s): D69.6 - THROMBOCYTOPENIA, UNSPECIFIED   Status: Chronic   Comment: 

likely due to Hep C. no overt or occult bleed. Monitor.Cont Lovenox at lower 

dose for DVT prophylaxis for now   





(14) Non compliance w medication regimen


Code(s): Z91.14 - PATIENT'S OTHER NONCOMPLIANCE WITH MEDICATION REGIMEN   Status

: Chronic   





- Plan


continue antibiotics, PT/OT, respiratory therapy, incentive spirometry, out of 

bed/ambulate, DVT proph w/SCDs


add doxycycline for possible leg wound infection. may need arterial doppler


-: cont wound care.


-: Clinically better.


-: cont Gabapentin


-: add CCB for Pulm HTN if BP permits





* .








Review of Systems





- Review of Systems


Constitutional: weakness, malaise


Respiratory: negative: Cough, Dry, Shortness of Breath, Hemoptysis, SOB with 

Excertion, Pleuritic Pain, Sputum, Wheezing


Cardiovascular: negative: chest pain, palpitations, orthopnea, paroxysmal 

nocturnal dyspnea, edema, light headedness, other


Gastrointestinal: negative: Nausea, Vomiting, Abdominal Pain, Diarrhea, 

Constipation, Melena, Hematochezia, Other


Genitourinary: negative: Dysuria, Frequency, Incontinence, Hematuria, Retention

, Other


Musculoskeletal: negative: Neck Pain, Shoulder Pain, Arm Pain, Back Pain, Hand 

Pain, Leg Pain, Foot Pain, Other


Neurological: negative: Weakness, Numbness, Incoordination, Change in Speech, 

Confusion, Seizures, Other





- Medications/Allergies


Allergies/Adverse Reactions: 


 Allergies











Allergy/AdvReac Type Severity Reaction Status Date / Time


 


No Known Drug Allergies Allergy   Verified 07/29/14 09:34











Medications: 


 Current Medications





Acetaminophen (Tylenol)  650 mg PO Q4H PRN


   PRN Reason: Headache/Fever/Mild Pain (1-3)


Hydrocodone Bitart/Acetaminophen (Norco 5/325)  1 tab PO Q4H PRN


   PRN Reason: Moderate Pain (4-6)


   Last Admin: 04/25/19 05:34 Dose:  1 tab


Albuterol/Ipratropium (Duoneb)  3 ml NEB Q6H PRN


   PRN Reason:  SOB


Aspirin (Aspirin Chewable)  81 mg PO QAM-[facility]


   Last Admin: 04/25/19 13:28 Dose:  81 mg


Clonidine (Catapres)  0.1 mg PO Q4H PRN


   PRN Reason: SBP>160


Enoxaparin Sodium (Lovenox)  30 mg SC 0900 [facility]


   Last Admin: 04/25/19 13:28 Dose:  30 mg


Furosemide (Lasix)  40 mg PO DAILY-[facility]


Gabapentin (Neurontin)  300 mg PO BID [facility]


   Last Admin: 04/25/19 13:29 Dose:  300 mg


Hydralazine HCl (Apresoline)  10 mg SLOW IVP Q4H PRN


   PRN Reason: SBP>170


Ceftriaxone Sodium 1 gm/ (Sodium Chloride)  100 mls @ 200 mls/hr IVPB Q24HR [facility]


   Last Admin: 04/25/19 05:31 Dose:  100 mls


Metronidazole 500 mg/ Device  100 mls @ 100 mls/hr IVPB Q8HR [facility]


   Last Admin: 04/25/19 05:31 Dose:  100 mls


Ondansetron HCl (Zofran Odt)  4 mg PO Q6H PRN


   PRN Reason: Nausea/Vomiting


Ondansetron HCl (Zofran)  4 mg IVP Q6H PRN


   PRN Reason: Nausea/Vomiting


Potassium Chloride (K-Dur)  40 meq PO QAM-[facility]


Potassium Chloride (Klor-Con)  20 meq PO 1500 [facility]


   Stop: 04/25/19 16:00


Sodium Bicarbonate (Bicarbonate, Sodium)  650 mg PO TID [facility]


   Last Admin: 04/25/19 13:29 Dose:  650 mg


Sodium Chloride (Flush - Normal Saline)  10 ml IVF Q12HR [facility]


   Last Admin: 04/25/19 08:40 Dose:  10 ml


Sodium Chloride (Flush - Normal Saline)  10 ml IVF PRN PRN


   PRN Reason: Saline Flush


Tramadol HCl (Ultram)  50 mg PO Q4H PRN


   PRN Reason: Moderate pain

## 2019-04-26 LAB
HCV RNA SERPL NAA+PROBE-ACNC: (no result) IU/ML
HCV RNA SERPL NAA+PROBE-LOG IU: 5.92 {LOG_IU}/ML

## 2019-04-26 RX ADMIN — DOCUSATE SODIUM 50 MG AND SENNOSIDES 8.6 MG SCH TAB: 8.6; 5 TABLET, FILM COATED ORAL at 21:20

## 2019-04-26 RX ADMIN — CEFTRIAXONE SCH MLS: 1 INJECTION, POWDER, FOR SOLUTION INTRAMUSCULAR; INTRAVENOUS at 06:22

## 2019-04-26 RX ADMIN — Medication SCH ML: at 09:58

## 2019-04-26 RX ADMIN — SODIUM BICARBONATE SCH MG: 325 TABLET ORAL at 09:01

## 2019-04-26 RX ADMIN — METRONIDAZOLE SCH MLS: 500 INJECTION, SOLUTION INTRAVENOUS at 05:36

## 2019-04-26 RX ADMIN — ASPIRIN 81 MG CHEWABLE TABLET SCH MG: 81 TABLET CHEWABLE at 09:01

## 2019-04-26 RX ADMIN — AMOXICILLIN AND CLAVULANATE POTASSIUM SCH MG: 875; 125 TABLET, FILM COATED ORAL at 21:20

## 2019-04-26 RX ADMIN — HYDROCODONE BITARTRATE AND ACETAMINOPHEN PRN TAB: 5; 325 TABLET ORAL at 06:21

## 2019-04-26 RX ADMIN — Medication SCH ML: at 21:22

## 2019-04-26 NOTE — PDOC.PN
- Subjective


Encounter Start Date: 04/26/19


Encounter Start Time: 10:30





Patient seen and examined for CHF/BEVERLY/Cellulitis. Feeling better. No new 

complaints. No overnight events





- Objective


Resuscitation Status - Order Detail:





04/23/19 22:20


Resuscitation Status Routine 


   Resuscitation Status: FULL: Full Resuscitation








MAR Reviewed: Yes


Vital Signs & Weight: 


 Vital Signs (12 hours)











  Pulse


 


 04/26/19 09:57  100








 Weight











Admit Weight                   193 lb 7 oz


 


Weight                         185 lb 9.6 oz














I&O: 


 











 04/25/19 04/26/19 04/27/19





 06:59 06:59 06:59


 


Intake Total 1980 1700 


 


Output Total 3500 1900 


 


Balance -1520 -200 











Result Diagrams: 


 04/25/19 06:05





 04/25/19 14:41


Additional Labs: 


 Accuchecks











  04/26/19 04/26/19 04/25/19





  11:09 06:08 20:21


 


POC Glucose  119 H  94  131 H














  04/25/19





  17:01


 


POC Glucose  103











Radiology Reviewed by me: No (HIDA - negative)


EKG Reviewed by me: Yes (Tele SR)





Phys Exam





- Physical Examination


Constitutional: NAD


Respiratory: no wheezing, no rhonchi


Cardiovascular: RRR, no rub


Gastrointestinal: soft, non-tender, positive bowel sounds


Musculoskeletal: edema present


LLE erythema improving


Neurological: non-focal





Dx/Plan





- Plan


plan discussed w/ family, DVT proph w/SCDs





IMPRESSION:


Gen weakness


Acute on chronic Right heart failure / Pulmonary HTN


BEVERLY on CKD 2 due to Cardiorenal syndrome


LLE Cellulitis


N/V/Abd pain - improving


HTN


Chronic Hepatitis C


Abn LFTs - due to passive hepatic congestion


Type 2 MI


Metabolic acidosis/Lactic acidosis due to BEVERLY


Hypokalemia


Dental caries


h/o Polysubstance abuse


Thrombocytopenia


Constipation


Peripheral neuropathy





PLAN:


Change Lasix to PO


Change Atbx to PO Augmentin


Cont Fluid restriction


CHF Education


AM labs


Replace Potassium


Treat constipation











Review of Systems





- Review of Systems


Respiratory: negative: Cough, Dry, Shortness of Breath, Hemoptysis, SOB with 

Excertion, Pleuritic Pain, Sputum, Wheezing


Cardiovascular: negative: chest pain, palpitations, orthopnea, paroxysmal 

nocturnal dyspnea, edema, light headedness, other





- Medications/Allergies


Allergies/Adverse Reactions: 


 Allergies











Allergy/AdvReac Type Severity Reaction Status Date / Time


 


No Known Drug Allergies Allergy   Verified 07/29/14 09:34











Medications: 


 Current Medications





Acetaminophen (Tylenol)  650 mg PO Q4H PRN


   PRN Reason: Headache/Fever/Mild Pain (1-3)


Albuterol/Ipratropium (Duoneb)  3 ml NEB Q6H PRN


   PRN Reason:  SOB


Amoxicillin/Clavulanate Potassium (Augmentin)  875 mg PO Q12HR [facility]


Aspirin (Aspirin Chewable)  81 mg PO QAM-[facility]


   Last Admin: 04/26/19 09:01 Dose:  81 mg


Clonidine (Catapres)  0.1 mg PO Q4H PRN


   PRN Reason: SBP>160


Furosemide (Lasix)  40 mg PO 0900,1400 [facility]


   Last Admin: 04/26/19 14:34 Dose:  40 mg


Gabapentin (Neurontin)  300 mg PO BID [facility]


   Last Admin: 04/26/19 09:01 Dose:  300 mg


Hydralazine HCl (Apresoline)  10 mg SLOW IVP Q4H PRN


   PRN Reason: SBP>170


Lisinopril (Zestril)  2.5 mg PO DAILY [facility]


Ondansetron HCl (Zofran Odt)  4 mg PO Q6H PRN


   PRN Reason: Nausea/Vomiting


Ondansetron HCl (Zofran)  4 mg IVP Q6H PRN


   PRN Reason: Nausea/Vomiting


Polyethylene Glycol (Miralax)  17 gm PO DAILY [facility]


Potassium Chloride (K-Dur)  40 meq PO QA-[facility]


   Last Admin: 04/26/19 09:01 Dose:  40 meq


Senna/Docusate Sodium (Senokot S)  2 tab PO BID [facility]


Sodium Chloride (Flush - Normal Saline)  10 ml IVF Q12HR [facility]


   Last Admin: 04/26/19 09:58 Dose:  10 ml


Sodium Chloride (Flush - Normal Saline)  10 ml IVF PRN PRN


   PRN Reason: Saline Flush


Tramadol HCl (Ultram)  50 mg PO Q4H PRN


   PRN Reason: Moderate pain


Tramadol HCl (Ultram)  50 mg PO Q4H PRN


   PRN Reason: Moderate Pain (4-6)

## 2019-04-27 VITALS — TEMPERATURE: 98.1 F

## 2019-04-27 VITALS — DIASTOLIC BLOOD PRESSURE: 81 MMHG | SYSTOLIC BLOOD PRESSURE: 124 MMHG

## 2019-04-27 LAB
ALBUMIN SERPL BCG-MCNC: 3.3 G/DL (ref 3.5–5)
ALP SERPL-CCNC: 93 U/L (ref 40–150)
ALT SERPL W P-5'-P-CCNC: 77 U/L (ref 8–55)
ANION GAP SERPL CALC-SCNC: 13 MMOL/L (ref 10–20)
AST SERPL-CCNC: 101 U/L (ref 5–34)
BASOPHILS # BLD AUTO: 0.1 THOU/UL (ref 0–0.2)
BASOPHILS NFR BLD AUTO: 1.6 % (ref 0–1)
BILIRUB SERPL-MCNC: 2.2 MG/DL (ref 0.2–1.2)
BUN SERPL-MCNC: 22 MG/DL (ref 8.4–25.7)
CALCIUM SERPL-MCNC: 9.1 MG/DL (ref 7.8–10.44)
CHLORIDE SERPL-SCNC: 98 MMOL/L (ref 98–107)
CO2 SERPL-SCNC: 30 MMOL/L (ref 22–29)
CREAT CL PREDICTED SERPL C-G-VRATE: 94 ML/MIN (ref 70–130)
EOSINOPHIL # BLD AUTO: 0.2 THOU/UL (ref 0–0.7)
EOSINOPHIL NFR BLD AUTO: 3.8 % (ref 0–10)
GLOBULIN SER CALC-MCNC: 2.9 G/DL (ref 2.4–3.5)
GLUCOSE SERPL-MCNC: 91 MG/DL (ref 70–105)
HGB BLD-MCNC: 15.8 G/DL (ref 14–18)
LYMPHOCYTES # BLD: 1.4 THOU/UL (ref 1.2–3.4)
LYMPHOCYTES NFR BLD AUTO: 22 % (ref 21–51)
MAGNESIUM SERPL-MCNC: 1.6 MG/DL (ref 1.6–2.6)
MCH RBC QN AUTO: 29.5 PG (ref 27–31)
MCV RBC AUTO: 92.7 FL (ref 78–98)
MONOCYTES # BLD AUTO: 0.7 THOU/UL (ref 0.11–0.59)
MONOCYTES NFR BLD AUTO: 10.7 % (ref 0–10)
NEUTROPHILS # BLD AUTO: 3.9 THOU/UL (ref 1.4–6.5)
NEUTROPHILS NFR BLD AUTO: 61.9 % (ref 42–75)
PLATELET # BLD AUTO: 104 THOU/UL (ref 130–400)
POTASSIUM SERPL-SCNC: 3.5 MMOL/L (ref 3.5–5.1)
RBC # BLD AUTO: 5.36 MILL/UL (ref 4.7–6.1)
SODIUM SERPL-SCNC: 137 MMOL/L (ref 136–145)
WBC # BLD AUTO: 6.3 THOU/UL (ref 4.8–10.8)

## 2019-04-27 RX ADMIN — CHLORHEXIDINE GLUCONATE 0.12% ORAL RINSE SCH ML: 1.2 LIQUID ORAL at 20:27

## 2019-04-27 RX ADMIN — SALINE NASAL SPRAY SCH SPR: 1.5 SOLUTION NASAL at 20:27

## 2019-04-27 RX ADMIN — DOCUSATE SODIUM 50 MG AND SENNOSIDES 8.6 MG SCH TAB: 8.6; 5 TABLET, FILM COATED ORAL at 09:47

## 2019-04-27 RX ADMIN — AMOXICILLIN AND CLAVULANATE POTASSIUM SCH MG: 875; 125 TABLET, FILM COATED ORAL at 09:46

## 2019-04-27 RX ADMIN — AMOXICILLIN AND CLAVULANATE POTASSIUM SCH MG: 875; 125 TABLET, FILM COATED ORAL at 20:28

## 2019-04-27 RX ADMIN — SALINE NASAL SPRAY SCH SPR: 1.5 SOLUTION NASAL at 15:01

## 2019-04-27 RX ADMIN — DOCUSATE SODIUM 50 MG AND SENNOSIDES 8.6 MG SCH: 8.6; 5 TABLET, FILM COATED ORAL at 20:28

## 2019-04-27 RX ADMIN — ASPIRIN 81 MG CHEWABLE TABLET SCH MG: 81 TABLET CHEWABLE at 09:45

## 2019-04-27 RX ADMIN — Medication SCH: at 20:29

## 2019-04-27 RX ADMIN — Medication SCH ML: at 09:47

## 2019-04-27 RX ADMIN — CHLORHEXIDINE GLUCONATE 0.12% ORAL RINSE SCH ML: 1.2 LIQUID ORAL at 15:02

## 2019-04-27 NOTE — DIS
DATE OF ADMISSION:  04/23/2019



DATE OF DISCHARGE:  04/27/2019



DISCHARGE DISPOSITION:  Home.



FOLLOWUP:  

1. Follow up with primary care physician at [facility] in 1 week.

2. Follow up with Gastroenterology as outpatient for hepatitis C.

3. Follow up with Pulmonary as outpatient for pulmonary hypertension.



ALLERGIES:  NO KNOWN DRUG ALLERGIES.



DISCHARGE MEDICATIONS:  

1. Aspirin 81 mg daily.

2. Augmentin 875 mg b.i.d. for 1 week.

3. Doxycycline 100 mg b.i.d. for 1 week.

4. Albuterol inhaler as needed.

5. Lasix 20 mg b.i.d.

6. Neurontin 100 mg daily.

7. Lisinopril 2.5 mg b.i.d.

8. MiraLAX daily.

9. Senokot-S two tablets b.i.d. until constipation resolves.

10. Potassium chloride 10 mEq b.i.d. for next 14 days. 



The patient was seen and examined on the day of discharge.  Denies any new

complaints.  No chest pain, shortness of breath, or palpitations reported. 



The patient was advised to follow up on the hepatitis C RNA results. 



Basic metabolic profile after 1 week is recommended.  Primary care physician advised

to follow. 



BRIEF HOSPITAL COURSE:  The patient is a 54-year-old male with chronic hepatitis C,

GERD, and congestive heart failure, presented to the hospital with generalized

weakness.  He has gained approximately 20 to 25 pounds recently.  Please note that

he was out of all of his medications.  Please refer to the history and physical for

further details. 



The patient was admitted to the hospital with a diagnosis of generalized weakness

along with congestive heart failure exacerbation.  He was started on Lasix with good

improvement in his symptoms.  His weight on the day of discharge is 176 pounds from

195 pounds on admission.  He was extensively counseled on congestive heart failure.

His echocardiogram showed severely enlarged right ventricle cavity with markedly

enlarged right atrium size with severe tricuspid regurgitation, severely elevated

pulmonary artery pressure.  Ejection fraction was 55% to 60%.  Please note that the

patient has a history of pulmonary hypertension and is out of all of his medications

including Lasix.  The patient was extensively counseled to be compliant with fluid

restriction.  Repeat CMP after 1 week is recommended.  Primary care physician

advised to follow. 



The patient was also found to have elevated LFTs with total bilirubin of 6.2 on

admission.  CT scan of the abdomen and pelvis showed diffuse anasarca with ascites

and bowel wall thickening.  He was evaluated by General Surgery for abnormal LFTs

due to concern of possible acute cholecystitis.  HIDA scan, however, came back

negative.  His LFTs improved with diuresis.  His bilirubin on the day of discharge

is 2.2 from 6.2 on admission. 



The patient has significant lactic acidosis of 7.0 on admission that improved to 1.9

with diuresis.  The lactic acidosis was probably secondary to third spacing.  His

bicarbonate on admission was 12. 



The patient qualified for home oxygen, which will be arranged on the day of

discharge.  He has a long history of pulmonary hypertension without any followup.

He was advised to follow up with pulmonologist as outpatient. 



FINAL DIAGNOSES:  

1. Generalized weakness multifactorial.

2. Acute-on-chronic right-sided heart failure, improved.  The patient lost 20 pounds

weight during this admission. 

3. History of pulmonary hypertension and outpatient pulmonology followup is

recommended. 

4. Acute kidney injury on chronic kidney disease stage 2 secondary to cardiorenal

syndrome. 

5. Left lower extremity cellulitis with ulceration.  He will continue wound care

along with antibiotics. 

6. Nausea, vomiting, abdominal pain, improved.

7. Hypertension.

8. Chronic hepatitis C.  Hepatitis C RNA is pending at this time.  An outpatient GI

followup is recommended. 

9. Abnormal LFTs secondary to passive hepatic congestion, improving.

10. Follow up labs as outpatient is recommended.

11. Type 2 myocardial infarction.

12. Metabolic acidosis/lactic acidosis due to acute kidney injury and third spacing.

13. Acute kidney injury, improved.

14. Hypokalemia.

15. Poor dental hygiene/dental caries and outpatient dental followup is recommended.

16. History of polysubstance abuse.

17. Thrombocytopenia.

18. Constipation.

19. Peripheral neuropathy.

20. Hypoglycemia on admission.

21. Chronic kidney disease stage 2.



PLAN:  Plan of care was discussed with the patient in detail.  He stated

understanding. 



Total time coordinating the discharge of this patient was 39 minutes.







Job ID:  [id number]

## 2020-02-14 ENCOUNTER — HOSPITAL ENCOUNTER (OUTPATIENT)
Dept: HOSPITAL 92 - ERS | Age: 55
Setting detail: OBSERVATION
LOS: 3 days | Discharge: HOME | End: 2020-02-17
Attending: INTERNAL MEDICINE | Admitting: INTERNAL MEDICINE
Payer: MEDICARE

## 2020-02-14 VITALS — BODY MASS INDEX: 20.5 KG/M2

## 2020-02-14 DIAGNOSIS — Z86.19: ICD-10-CM

## 2020-02-14 DIAGNOSIS — I87.8: ICD-10-CM

## 2020-02-14 DIAGNOSIS — I50.9: ICD-10-CM

## 2020-02-14 DIAGNOSIS — G62.9: ICD-10-CM

## 2020-02-14 DIAGNOSIS — F17.200: ICD-10-CM

## 2020-02-14 DIAGNOSIS — R74.0: ICD-10-CM

## 2020-02-14 DIAGNOSIS — E80.6: ICD-10-CM

## 2020-02-14 DIAGNOSIS — I27.20: ICD-10-CM

## 2020-02-14 DIAGNOSIS — Z79.899: ICD-10-CM

## 2020-02-14 DIAGNOSIS — A04.72: ICD-10-CM

## 2020-02-14 DIAGNOSIS — I27.81: ICD-10-CM

## 2020-02-14 DIAGNOSIS — I11.0: ICD-10-CM

## 2020-02-14 DIAGNOSIS — K80.10: ICD-10-CM

## 2020-02-14 DIAGNOSIS — K74.69: ICD-10-CM

## 2020-02-14 DIAGNOSIS — F31.9: ICD-10-CM

## 2020-02-14 DIAGNOSIS — F20.9: ICD-10-CM

## 2020-02-14 DIAGNOSIS — R07.89: Primary | ICD-10-CM

## 2020-02-14 DIAGNOSIS — Z79.82: ICD-10-CM

## 2020-02-14 LAB
ALBUMIN SERPL BCG-MCNC: 3.4 G/DL (ref 3.5–5)
ALP SERPL-CCNC: 86 U/L (ref 40–110)
ALT SERPL W P-5'-P-CCNC: 37 U/L (ref 8–55)
ANION GAP SERPL CALC-SCNC: 14 MMOL/L (ref 10–20)
APTT PPP: 33.7 SEC (ref 22.9–36.1)
AST SERPL-CCNC: 74 U/L (ref 5–34)
BASOPHILS # BLD AUTO: 0.1 THOU/UL (ref 0–0.2)
BASOPHILS NFR BLD AUTO: 1.1 % (ref 0–1)
BILIRUB SERPL-MCNC: 2.6 MG/DL (ref 0.2–1.2)
BUN SERPL-MCNC: 20 MG/DL (ref 8.4–25.7)
CALCIUM SERPL-MCNC: 9 MG/DL (ref 7.8–10.44)
CHLORIDE SERPL-SCNC: 103 MMOL/L (ref 98–107)
CK MB SERPL-MCNC: 9.2 NG/ML (ref 0–6.6)
CO2 SERPL-SCNC: 23 MMOL/L (ref 22–29)
CREAT CL PREDICTED SERPL C-G-VRATE: 0 ML/MIN (ref 70–130)
DRUG SCREEN CUTOFF: (no result)
EOSINOPHIL # BLD AUTO: 0.3 THOU/UL (ref 0–0.7)
EOSINOPHIL NFR BLD AUTO: 4.9 % (ref 0–10)
GLOBULIN SER CALC-MCNC: 3.2 G/DL (ref 2.4–3.5)
GLUCOSE SERPL-MCNC: 108 MG/DL (ref 70–105)
HGB BLD-MCNC: 15.7 G/DL (ref 14–18)
INR PPP: 1.1
LIPASE SERPL-CCNC: 25 U/L (ref 8–78)
LYMPHOCYTES # BLD: 1.4 THOU/UL (ref 1.2–3.4)
LYMPHOCYTES NFR BLD AUTO: 22.3 % (ref 21–51)
MCH RBC QN AUTO: 30.3 PG (ref 27–31)
MCV RBC AUTO: 92 FL (ref 78–98)
MEDTOX CONTROL LINE VALID?: (no result)
MEDTOX READER #: (no result)
MONOCYTES # BLD AUTO: 0.5 THOU/UL (ref 0.11–0.59)
MONOCYTES NFR BLD AUTO: 8.5 % (ref 0–10)
NEUTROPHILS # BLD AUTO: 4 THOU/UL (ref 1.4–6.5)
NEUTROPHILS NFR BLD AUTO: 63.2 % (ref 42–75)
PLATELET # BLD AUTO: 94 THOU/UL (ref 130–400)
POTASSIUM SERPL-SCNC: 4.1 MMOL/L (ref 3.5–5.1)
PROTHROMBIN TIME: 13.8 SEC (ref 12–14.7)
RBC # BLD AUTO: 5.18 MILL/UL (ref 4.7–6.1)
SODIUM SERPL-SCNC: 136 MMOL/L (ref 136–145)
TROPONIN I SERPL DL<=0.01 NG/ML-MCNC: 0.04 NG/ML (ref ?–0.03)
TROPONIN I SERPL DL<=0.01 NG/ML-MCNC: 0.05 NG/ML (ref ?–0.03)
WBC # BLD AUTO: 6.3 THOU/UL (ref 4.8–10.8)

## 2020-02-14 PROCEDURE — 93306 TTE W/DOPPLER COMPLETE: CPT

## 2020-02-14 PROCEDURE — 36415 COLL VENOUS BLD VENIPUNCTURE: CPT

## 2020-02-14 PROCEDURE — 82553 CREATINE MB FRACTION: CPT

## 2020-02-14 PROCEDURE — 85610 PROTHROMBIN TIME: CPT

## 2020-02-14 PROCEDURE — 85730 THROMBOPLASTIN TIME PARTIAL: CPT

## 2020-02-14 PROCEDURE — 83605 ASSAY OF LACTIC ACID: CPT

## 2020-02-14 PROCEDURE — 93005 ELECTROCARDIOGRAM TRACING: CPT

## 2020-02-14 PROCEDURE — 84484 ASSAY OF TROPONIN QUANT: CPT

## 2020-02-14 PROCEDURE — A9537 TC99M MEBROFENIN: HCPCS

## 2020-02-14 PROCEDURE — 99285 EMERGENCY DEPT VISIT HI MDM: CPT

## 2020-02-14 PROCEDURE — 80306 DRUG TEST PRSMV INSTRMNT: CPT

## 2020-02-14 PROCEDURE — 85025 COMPLETE CBC W/AUTO DIFF WBC: CPT

## 2020-02-14 PROCEDURE — 97139 UNLISTED THERAPEUTIC PX: CPT

## 2020-02-14 PROCEDURE — 80053 COMPREHEN METABOLIC PANEL: CPT

## 2020-02-14 PROCEDURE — 96374 THER/PROPH/DIAG INJ IV PUSH: CPT

## 2020-02-14 PROCEDURE — 76705 ECHO EXAM OF ABDOMEN: CPT

## 2020-02-14 PROCEDURE — 83690 ASSAY OF LIPASE: CPT

## 2020-02-14 PROCEDURE — 80048 BASIC METABOLIC PNL TOTAL CA: CPT

## 2020-02-14 PROCEDURE — 80076 HEPATIC FUNCTION PANEL: CPT

## 2020-02-14 PROCEDURE — 71045 X-RAY EXAM CHEST 1 VIEW: CPT

## 2020-02-14 PROCEDURE — 78227 HEPATOBIL SYST IMAGE W/DRUG: CPT

## 2020-02-14 PROCEDURE — G0378 HOSPITAL OBSERVATION PER HR: HCPCS

## 2020-02-14 RX ADMIN — VANCOMYCIN HYDROCHLORIDE SCH MG: KIT at 19:19

## 2020-02-14 NOTE — PDOC.HHP
Hospitalist HPI





- History of Present Illness


Chest pain


History of Present Illness: 


This is a 54-year-old male with history of hypertension, CHF and peripheral 

neuropathy who presented to the hospital with complaints of central chest 

tightness that started earlier today after waking up from sleep.  The patient's 

pain did not radiate and was associated with palpitations and shortness of 

breath.  It persisted until receiving nitroglycerin in the emergency 

department.  The patient denies any current pain.  He was recently discharged 

from another hospital after being treated for C. difficile colitis.  The 

patient stated that he has at least 7 days to go on his oral vancomycin.








Hospitalist ROS





- Review of Systems


All other systems reviewed; all pertinent +/- noted in HPI/Subj





Hospitalist History





- Past Medical History


Cardiac: reports: CHF, HTN





- Past Surgical History


Past Surgical History: reports: Appendectomy, Tonsillectomy





- Family History


Family History: reports: no pertinent history





- Social History


Smoking Status: Never smoker


Alcohol: reports: None


Drugs: reports: none





- Exam


General Appearance: NAD, awake alert


Eye: PERRL, anicteric sclera


ENT: normocephalic atraumatic


Neck: supple


Heart: RRR, no murmur, no gallops, no rubs


Respiratory: CTAB, no wheezes, no rales


Gastrointestinal: soft, non-tender, non-distended, normal bowel sounds


Neurological: cranial nerve grossly intact, no focal deficits





Hospitalist Results





- Labs


Result Diagrams: 


 02/14/20 11:09





 02/14/20 11:09


Lab results: 


 











WBC  6.3 thou/uL (4.8-10.8)   02/14/20  11:09    


 


Hgb  15.7 g/dL (14.0-18.0)   02/14/20  11:09    


 


Hct  47.7 % (42.0-52.0)   02/14/20  11:09    


 


MCV  92.0 fL (78.0-98.0)   02/14/20  11:09    


 


Plt Count  94 thou/uL (130-400)  L  02/14/20  11:09    


 


Neutrophils %  63.2 % (42.0-75.0)   02/14/20  11:09    


 


Sodium  136 mmol/L (136-145)   02/14/20  11:09    


 


Potassium  4.1 mmol/L (3.5-5.1)   02/14/20  11:09    


 


Chloride  103 mmol/L ()   02/14/20  11:09    


 


Carbon Dioxide  23 mmol/L (22-29)   02/14/20  11:09    


 


BUN  20 mg/dL (8.4-25.7)   02/14/20  11:09    


 


Creatinine  1.04 mg/dL (0.7-1.3)   02/14/20  11:09    


 


Glucose  108 mg/dL ()  H  02/14/20  11:09    


 


Lactic Acid  2.0 mmol/L (0.5-2.2)   02/14/20  11:09    


 


Calcium  9.0 mg/dL (7.8-10.44)   02/14/20  11:09    


 


Total Bilirubin  2.6 mg/dL (0.2-1.2)  H  02/14/20  11:09    


 


AST  74 U/L (5-34)  H  02/14/20  11:09    


 


ALT  37 U/L (8-55)   02/14/20  11:09    


 


Alkaline Phosphatase  86 U/L ()   02/14/20  11:09    


 


CK-MB (CK-2)  9.2 ng/mL (0-6.6)  H*  02/14/20  11:09    


 


Troponin I  0.038 ng/mL (< 0.028)  H  02/14/20  11:09    


 


Serum Total Protein  6.6 g/dL (6.0-8.3)   02/14/20  11:09    


 


Albumin  3.4 g/dL (3.5-5.0)  L  02/14/20  11:09    


 


Lipase  25 U/L (8-78)   02/14/20  11:09    














Hospitalist H&P A/P





- Problem


(1) CHF (congestive heart failure)


Code(s): I50.9 - HEART FAILURE, UNSPECIFIED   Status: Acute   





(2) Chest pain


Code(s): R07.9 - CHEST PAIN, UNSPECIFIED   Status: Acute   





(3) Elevated troponin level


Code(s): R74.8 - ABNORMAL LEVELS OF OTHER SERUM ENZYMES   Status: Acute   





(4) Hypertension


Code(s): I10 - ESSENTIAL (PRIMARY) HYPERTENSION   Status: Chronic   





(5) Peripheral neuropathy


Code(s): G62.9 - POLYNEUROPATHY, UNSPECIFIED   Status: Chronic   





(6) Pulmonary hypertension


Code(s): I27.2 - OTHER SECONDARY PULMONARY HYPERTENSION * DO NOT USE *   Status

: Chronic   





- Plan


Plan: 


Placed in telemetry.


EKG showed right bundle branch block without ST-T abnormalities.


Initial troponin was slightly elevated.


Continue to trend troponins.


Aspirin 325 was given in route.


Continue aspirin 81 mg orally daily tomorrow.


Start metoprolol tartrate 12.5 mg orally twice daily.


Atorvastatin 40 mg orally nightly.


Nitroglycerin sublingual as needed for pain.


Consult cardiology.


N.p.o. after midnight.

## 2020-02-14 NOTE — ULT
RIGHT UPPER QUADRANT ULTRASOUND



CLINICAL HISTORY: Right upper quadrant pain.



COMPARISON: April 24, 2019                  



FINDINGS:



Liver:Normal echotexture without focal mass.



Intrahepatic bile ducts: No intrahepatic or extrahepatic biliary dilation.; 



Common bile duct:   5 mm.



Gallbladder: There is persistent gallbladder sludge and gallstones within the lumen of the gallbladde
r. There is mild gallbladder wall thickening measuring 3.7 mm. No pericholecystic fluid is

demonstrated.



Tejeda's sign:None



Main portal vein:Patent with hepatopedal flow.



Pancreas:Visualized pancreas appears normal.  



Right kidney: Right kidney measures 9.8 x 4.2 x 4.9 cm. No focal renal lesion or  hydronephrosis.



Additional findings:  None.





IMPRESSION:



Stable cholelithiasis with gallbladder sludge and mild collateral wall thickening. There is no sonogr
aphic evidence to suggest acute calculus cholecystitis.



Reported By: Pedro Luis Hawley 

Electronically Signed:  2/14/2020 11:44 AM

## 2020-02-14 NOTE — RAD
RADIOGRAPH CHEST 1 VIEW:

 

DATE: 

2-

 

HISTORY: 

54-year-old male with chest pain.

 

FINDINGS:

There is cardiomegaly. There is no evidence of air space density, pulmonary edema, or pneumothorax. T
he lateral costophrenic angles are sharp.

 

IMPRESSION:

1) No acute pulmonary findings.

2) Mild cardiomegaly without congestive heart failure.

 

 

jn 

 

POS: TPC

## 2020-02-14 NOTE — CON
DATE OF CONSULTATION:  02/14/2020



PRIMARY CARDIOLOGIST:  Troy Carmen MD



REASON FOR CONSULTATION:  Chest pain.



HISTORY OF PRESENT ILLNESS:  Mr. Cabral is a 54-year-old white gentleman, who comes

to the hospital for chest pain.  He was at the [facility] recently,

treated for C difficile colitis.  He was admitted overnight and discharged home.  He

came in as he felt right-sided chest pain.  He tells me that it is worse when he

takes a deep breath or when he coughs.  He had heart catheterization in this

hospital in the past by Dr. Stoddard in 2015, which showed no significant coronary

artery disease.  He has had multiple admissions for chest pain since.  His troponin

is always in the indeterminate range.  Currently, he denies any more chest pain.  He

states that he is no longer having diarrhea.  He is only having normal bowel

movements.  He states he was at [facility] for 1 whole week and he was

discharged home, told he had septic shock secondary to C difficile colitis. 



PAST MEDICAL HISTORY:  

1. Pulmonary hypertension.

2. Essential hypertension.

3. Depression.

4. Schizophrenia.

5. Bipolar disorder.

6. Polysubstance abuse.

7. Hepatitis C.

8. Recent C difficile colitis.



SURGICAL HISTORY:  

1. Right foot surgery.

2. Tonsillectomy.

3. Appendectomy.



FAMILY HISTORY:  Positive for early coronary artery disease.



SOCIAL HISTORY:  History of methamphetamine, marijuana, and cocaine use in the past.

 Continues to smoke. 



REVIEW OF SYSTEMS:  A 12-point review of systems was done and was all negative

unless stated in the history of present illness. 



OUTPATIENT MEDICATIONS:  

1. Gabapentin.

2. Aspirin 81.

3. Atorvastatin 40 a day.

4. Tylenol p.r.n.

5. Tamsulosin.

6. Lisinopril.

7. Furosemide 10 mg b.i.d.

8. P.o. vancomycin added recently.



ALLERGIES:  NO KNOWN DRUG ALLERGIES.



PHYSICAL EXAMINATION:

VITAL SIGNS:  Temperature 96.3, pulse 103, respiratory rate 19, satting 95% on 2 L

nasal cannula, blood pressure 136/92. 

GENERAL:  Awake, alert, and oriented x3, in no distress. 

HEENT:  Normocephalic and atraumatic. 

NECK:  Supple. 

LUNGS:  Clear. 

CARDIOVASCULAR:  S1 and S2.  No S3 or S4.  There is a grade 2/6 systolic murmur at

the right upper sternal border. 

ABDOMEN:  Soft.  Positive bowel sounds. 

EXTREMITIES:  No edema. 

SKIN:  Warm and dry.



LABORATORY DATA:  Laboratory work is reviewed.  Sodium of 136, potassium of 4.1.

The rest of the metabolic was unremarkable except for a glucose of 108, total

bilirubin of 2.6, AST of 74, and albumin of 3.4.  CK-MB was 9.2.  Troponin I was

0.03, 0.04, and 0.05.  Toxicology was negative.  Coags were normal.  Hematology was

normal with a white count of 6, hemoglobin of 15, hematocrit of 47, and platelet

count of 94 in the low side. 



Chest x-ray, no evidence of CHF. 



Abdominal ultrasound, stable cholelithiasis with gallbladder sludge with mild

collateral wall thickening, but no cholecystitis. 



Most recent echocardiogram was done in April 2019 showed an EF of 55% to 60% with a

large right atrium, large right ventricle, severe TR and severely elevated right

ventricular systolic pressures. 



ASSESSMENT:  

1. Chest pain.  Likely noncardiac, it hurts more when he takes a deep breath or when

he moves.  Likely musculoskeletal in nature.  He had normal heart catheterization

with normal coronaries in January 2015. 

2. Chronically elevated troponins.  Unchanged from his normal baseline.

3. Pulmonary hypertension.



PLAN:  

1. We will get an echocardiogram.

2. No plans for coronary angiogram at this time as he has had a negative one in the

past and his troponin elevation is actually at his normal baseline.  His symptoms

are atypical. 

3. Continue to treat C difficile colitis.

4. We will follow.







Job ID:  [id number]

## 2020-02-15 LAB
ALBUMIN SERPL BCG-MCNC: 3.4 G/DL (ref 3.5–5)
ALP SERPL-CCNC: 126 U/L (ref 40–110)
ALT SERPL W P-5'-P-CCNC: 36 U/L (ref 8–55)
ANION GAP SERPL CALC-SCNC: 15 MMOL/L (ref 10–20)
AST SERPL-CCNC: 69 U/L (ref 5–34)
BASOPHILS # BLD AUTO: 0.1 THOU/UL (ref 0–0.2)
BASOPHILS NFR BLD AUTO: 1.1 % (ref 0–1)
BILIRUB DIRECT SERPL-MCNC: 1.6 MG/DL (ref 0.1–0.3)
BILIRUB SERPL-MCNC: 2.2 MG/DL (ref 0.2–1.2)
BUN SERPL-MCNC: 23 MG/DL (ref 8.4–25.7)
CALCIUM SERPL-MCNC: 8.9 MG/DL (ref 7.8–10.44)
CHLORIDE SERPL-SCNC: 104 MMOL/L (ref 98–107)
CO2 SERPL-SCNC: 22 MMOL/L (ref 22–29)
CREAT CL PREDICTED SERPL C-G-VRATE: 92 ML/MIN (ref 70–130)
EOSINOPHIL # BLD AUTO: 0.3 THOU/UL (ref 0–0.7)
EOSINOPHIL NFR BLD AUTO: 4.2 % (ref 0–10)
GLUCOSE SERPL-MCNC: 98 MG/DL (ref 70–105)
HGB BLD-MCNC: 15.7 G/DL (ref 14–18)
LYMPHOCYTES # BLD: 1.5 THOU/UL (ref 1.2–3.4)
LYMPHOCYTES NFR BLD AUTO: 22 % (ref 21–51)
MCH RBC QN AUTO: 30.1 PG (ref 27–31)
MCV RBC AUTO: 94.5 FL (ref 78–98)
MONOCYTES # BLD AUTO: 0.8 THOU/UL (ref 0.11–0.59)
MONOCYTES NFR BLD AUTO: 11 % (ref 0–10)
NEUTROPHILS # BLD AUTO: 4.3 THOU/UL (ref 1.4–6.5)
NEUTROPHILS NFR BLD AUTO: 61.7 % (ref 42–75)
PLATELET # BLD AUTO: 106 THOU/UL (ref 130–400)
POTASSIUM SERPL-SCNC: 4.2 MMOL/L (ref 3.5–5.1)
RBC # BLD AUTO: 5.21 MILL/UL (ref 4.7–6.1)
SODIUM SERPL-SCNC: 137 MMOL/L (ref 136–145)
WBC # BLD AUTO: 7 THOU/UL (ref 4.8–10.8)

## 2020-02-15 RX ADMIN — VANCOMYCIN HYDROCHLORIDE SCH MG: KIT at 00:27

## 2020-02-15 RX ADMIN — VANCOMYCIN HYDROCHLORIDE SCH: KIT at 15:18

## 2020-02-15 RX ADMIN — VANCOMYCIN HYDROCHLORIDE SCH MG: KIT at 05:58

## 2020-02-15 RX ADMIN — VANCOMYCIN HYDROCHLORIDE SCH MG: KIT at 18:35

## 2020-02-15 RX ADMIN — ASPIRIN 81 MG CHEWABLE TABLET SCH: 81 TABLET CHEWABLE at 13:30

## 2020-02-15 NOTE — PDOC.FM
- Subjective


Subjective: 


He says he was unable to finish his HIDA scan. He got out of breath. He says he 

was hurting all over too. He is no longer having chest pain. He said his chest 

pain felt like tightness and he was really short of breath. He has had no PND.








- Objective


MAR Reviewed: Yes


Vital Signs & Weight: 


 Vital Signs (12 hours)











  Temp Pulse Resp BP BP Pulse Ox


 


 02/15/20 11:06   96  14   130/98 H  95


 


 02/15/20 07:55  97.2 F L  93  18   122/90  91 L


 


 02/15/20 05:58   88  18  118/95 H   90 L


 


 02/15/20 00:27   95  18  118/89   90 L








 Weight











Weight                         76.385 kg














I&O: 


 











 02/14/20 02/15/20 02/16/20





 06:59 06:59 06:59


 


Output Total  1400 


 


Balance  -1400 











Result Diagrams: 


 02/15/20 04:25





 02/15/20 04:25





Phys Exam





- Physical Examination


Constitutional: NAD


HEENT: moist MMs, oral pharynx no lesions


Neck: supple, full ROM


Respiratory: no wheezing, no rales, no rhonchi, clear to auscultation bilateral


Cardiovascular: RRR


systolic murmur


Gastrointestinal: soft, non-tender, positive bowel sounds


Musculoskeletal: pulses present


1+ pitting edema


Neurological: moves all 4 limbs


Deviation from normal: Venous Stasis





Dx/Plan


(1) CHF (congestive heart failure)


Code(s): I50.9 - HEART FAILURE, UNSPECIFIED   Status: Acute   





(2) Chest pain


Code(s): R07.9 - CHEST PAIN, UNSPECIFIED   Status: Acute   





(3) Elevated troponin level


Code(s): R74.8 - ABNORMAL LEVELS OF OTHER SERUM ENZYMES   Status: Acute   





(4) Hyperbilirubinemia


Code(s): E80.6 - OTHER DISORDERS OF BILIRUBIN METABOLISM   Status: Acute   





(5) Hypertension


Code(s): I10 - ESSENTIAL (PRIMARY) HYPERTENSION   Status: Chronic   





(6) Peripheral neuropathy


Code(s): G62.9 - POLYNEUROPATHY, UNSPECIFIED   Status: Chronic   





(7) Poly-drug misuser


Code(s): F19.10 - OTHER PSYCHOACTIVE SUBSTANCE ABUSE, UNCOMPLICATED   Status: 

Chronic   





(8) Pulmonary hypertension


Code(s): I27.2 - OTHER SECONDARY PULMONARY HYPERTENSION * DO NOT USE *   Status

: Chronic   





- Plan


Plan: 


Pt is a 55 yo M with PMH of HTN, Pulm HTN, CHF, Hep C, peripheral neuropathy, 

Cor pulmonale, and BPH who presents fo chest tightness.





1. Chest Pain


Trops: 0.038 > 0.044 > 0.052


* Cardiology consulted, appreciate recs


 * No need for cath at this time, trops are his baseline


 * Will get ECHO due to heart failure history


 


2. CHF


* Strict I/O, fluid restricted diet


* ASA and Metoprolol started


* Continue home meds: Lisinopril, Lasix





3. Cor pulmonale 2/2 severe pulmonary HTN


* Will monitor for signs and symptoms


* Currently on home Lasix dose





4. RUQ Pain with Eleveated Bilirubin


TB: 2.2 DB: 1.6


* Gen Surgery consulted, appreciate recs


 * Cholelithiasis with chronic cholecystitis with gallbladder wall thickening


 * HIDA scan


* Trying to obtain records from [facility]


 


5. Transamnitis


* Will monitor 


* Hx of Hep C Cirrhosis





6. Hx polysubstance abuse


* Encourage cessation





Code Status: Full


Diet: NPO


Activity: Ad Holly


DVT PPx: Lovenox





Dispo: Tele inpt, LOS > 48H. Will await HIDA scan and ECHO.

## 2020-02-15 NOTE — PRG
DATE OF SERVICE:  



Please see note from Dr. Fowler, for which I agree.  The patient was seen, evaluated,

and discussed with the residents.  This gentleman is being transferred to our care

as he sees an [facility] doctor as his primary care, comes in for chest

pain.  Dr. Whitley has already seen him, did not think it was coronary artery

disease, probably more an issue with extremely high pressures in the right side of

the heart and pulmonary hypertension, for which he is taking Lasix.  He just got out

of the hospital for prolonged Clostridium difficile infection and possible sepsis

from that, but sounds like he still was extremely weak when he left the hospital.

Does not sound like he has ever had a sleep study and then likely something we have

to look into.  Does have a history of hepatitis C that sounds like he is still on

occasion using recreational drugs and is still probably not a good candidate for hep

C treatment.  HIDA scan came back normal.  He is having some right upper quadrant

pain and some thickened gallbladder wall, that was a question, but it looks like it

is not the issue.  Echocardiogram just shows the major right-sided elevated

pressure, but otherwise EF is preserved at 50% to 55%.  So, the plan is gentle

diuresis, talk to Cardiology about if they want to add anything for pulmonary

hypertension reasons, otherwise we will continue him on medications, try to get

rehab involve and physical therapy and continue to treat the C diff with vancomycin

as well.  I talked about all things. 







Job ID:  [id number]

## 2020-02-15 NOTE — PDOC.EVN
Event Note





- Event Note


Event Note: 





patient of Dr Bergeron (  sp?). have called residents to see patient

## 2020-02-15 NOTE — NM
Hepatobiliary scan:

2/15/2020



COMPARISON: 4/25/2019



HISTORY: Right upper quadrant pain, gallbladder disease



TECHNIQUE: The patient was pretreated with 1.5 mcg of CCK. Then the patient was injected with 5.5 mCi
 technetium 99m labeled mebrofenin.



FINDINGS: Prompt radiotracer activity is seen on post injection imaging. Bowel activity is seen by ap
proximately 10 minutes. Gallbladder activity is noted at approximately 40-45 minutes.



IMPRESSION: Gallbladder activity is seen on this examination consistent with patency of the cystic du
ct. 



Reported By: Edmundo Long 

Electronically Signed:  2/15/2020 3:41 PM

## 2020-02-15 NOTE — CON
DATE OF CONSULTATION:  02/14/2020



REQUESTING PHYSICIAN:  Hi Mckenna MD



HISTORY OF PRESENT ILLNESS:  A 54-year-old man with history of chronic hepatitis C,

chronic congestive heart failure, and essential hypertension, presented to the

emergency department yesterday with acute onset chest pain. 



Acute coronary syndrome has been excluded. 



The patient's pain appears to be atypical. 



On my evaluation, the patient denies any pain; however, reports epigastric to right

upper quadrant abdominal pain, which is intermittent, usually not associated with

activity and has been present over the last three years. 



He denies any change in his bowel habits except for recent diarrhea, for which he

was treated at local hospital for C diff colitis complicated by sepsis. 



He was recently discharged from the hospital. 



He apparently has had multiple workup of his chronic liver disease at the local

hospital including what he describes as MRI and other studies.  Ultrasound of his

abdomen was obtained in this hospitalization which reveals multiple intraluminal

gallstones with gallbladder wall thickening and no pericholecystic fluid. 



The patient reports that he has been told in the past that he has biliary sludge,

but no stones.  He denies any fevers or chills. 



He denies any jaundice.



PAST MEDICAL HISTORY:  Significant for chronic hepatitis C, unsure as to whether he

has liver cirrhosis, essential hypertension, schizophrenia and bipolar disorder,

chronic depression, recent C diff colitis with sepsis, and polysubstance abuse. 



PAST SURGICAL HISTORY:  Pertinent for appendectomy; tonsillectomy and adenoidectomy;

cardiac catheterization in 2015, which revealed no coronary artery disease; and some

foot surgery. 



SOCIAL HISTORY:  He is .  He used to work as a .  He has had

history of a polysubstance abuse at various times including alcohol,

methamphetamine, cocaine, and marijuana. 



FAMILY HISTORY:  Noncontributory for this patient's age.



PRE-HOSPITALIZATION MEDICATIONS:  Include: 

1. Atorvastatin 40 mg p.o. q.h.s.

2. Gabapentin 100 mg p.o. t.i.d.

3. Lisinopril 10 mg p.o. b.i.d.

4. Tamsulosin 0.4 mg p.o. daily. 

5. Aspirin 81 mg p.o. daily.

6. Furosemide 20 mg p.o. b.i.d.

7. Acetaminophen 650 mg p.o. q.4 hours p.r.n. pain.



ALLERGIES:  THE PATIENT HAS NO KNOWN DRUG ALLERGIES.



REVIEW OF SYSTEMS:  Ten-point review of systems essentially unremarkable except as

stated in past medical history and chief complaint. 



PHYSICAL EXAMINATION:

GENERAL:  This reveals a 54-year-old normally developed man, who is otherwise

coherent, interactive, and appears stated age.  The patient is alert and oriented

x3.  He appears to be in no acute distress at the time of my evaluation. 

VITAL SIGNS:  Included blood pressure of 127/87, pulse 104, respiratory rate 16,

temperature is 98.3 degrees Fahrenheit, oxygen saturation is 96% on 3 L by nasal

cannula oxygen. 

HEENT:  Reveals normocephalic and atraumatic.  He has no scleral icterus present.

Pupils are equal, round, reactive to light and accommodation. 

HEART:  Reveals regular rate with sinus tachycardia.  No murmurs or gallops

auscultated. 

LUNGS:  Clear to auscultation bilaterally.  Breathing, regular and nonlabored. 

ABDOMEN:  Soft, moderately distended, but nontender to palpation.  Liver and spleen

nonpalpable below costal margin. 

NEUROLOGIC:  Reveals no focal deficits present.



LABORATORY FINDINGS:  Include a CBC with 6200 white blood cells, hemoglobin and

hematocrit 15.7 and 47.7 respectively. 



Platelet count is 94,000. 



Metabolic profile:  Sodium 136, potassium 4.1, chloride is 103, bicarb is 23, BUN

20, creatinine is 1.04, glucose 108, lactic acid 2.0, total bilirubin 2.6, AST and

ALT 74 and 37 respectively. 



Troponin I in series were noted at 0.038, 0.044, and 0.052.  Serum lipase is normal

at 25. 



I have personally reviewed the abdominal ultrasound, which was obtained on this

admission, which is remarkable for multiple intraluminal gallstones and biliary

sludge. 



There is apparent aggregation of the sludge and stones in the gallbladder neck. 



There is gallbladder wall thickening with no associated pericholecystic fluid

present. 



Common bile duct is normal in diameter for this patient's age at 5 mm.



IMPRESSIONS:  

1. Atypical chest pain/right upper quadrant abdominal pain, currently nonactive.

2. Cholelithiasis with evidence of chronic cholecystitis in the form of a

gallbladder wall thickening, although there is no evidence of acute cholecystitis at

this time. 

3. History of liver disease and polysubstance abuse.



RECOMMENDATIONS:  We will obtain HIDA scan to better define the extent of the

biliary disease and exclude any acute process. 



There is no immediate surgical indication for this patient at this time; however, we

will make further recommendations based on the outcome of the HIDA scan. 



Above findings and plan discussed with the patient and his wife at bedside. 



They both indicated understanding of information given. 



Thank you again, Dr. Mckenna, for allowing me the opportunity to participate in the

care of this patient. 







Job ID:  [id number]

## 2020-02-16 LAB
ANION GAP SERPL CALC-SCNC: 12 MMOL/L (ref 10–20)
BASOPHILS # BLD AUTO: 0.1 THOU/UL (ref 0–0.2)
BASOPHILS NFR BLD AUTO: 1.7 % (ref 0–1)
BUN SERPL-MCNC: 22 MG/DL (ref 8.4–25.7)
CALCIUM SERPL-MCNC: 9 MG/DL (ref 7.8–10.44)
CHLORIDE SERPL-SCNC: 105 MMOL/L (ref 98–107)
CO2 SERPL-SCNC: 21 MMOL/L (ref 22–29)
CREAT CL PREDICTED SERPL C-G-VRATE: 89 ML/MIN (ref 70–130)
EOSINOPHIL # BLD AUTO: 0.2 THOU/UL (ref 0–0.7)
EOSINOPHIL NFR BLD AUTO: 2.7 % (ref 0–10)
GLUCOSE SERPL-MCNC: 92 MG/DL (ref 70–105)
HGB BLD-MCNC: 15.4 G/DL (ref 14–18)
LYMPHOCYTES # BLD: 1.6 THOU/UL (ref 1.2–3.4)
LYMPHOCYTES NFR BLD AUTO: 23.7 % (ref 21–51)
MCH RBC QN AUTO: 30.4 PG (ref 27–31)
MCV RBC AUTO: 92.5 FL (ref 78–98)
MONOCYTES # BLD AUTO: 0.8 THOU/UL (ref 0.11–0.59)
MONOCYTES NFR BLD AUTO: 11.4 % (ref 0–10)
NEUTROPHILS # BLD AUTO: 4.1 THOU/UL (ref 1.4–6.5)
NEUTROPHILS NFR BLD AUTO: 60.5 % (ref 42–75)
PLATELET # BLD AUTO: 121 THOU/UL (ref 130–400)
POTASSIUM SERPL-SCNC: 4.3 MMOL/L (ref 3.5–5.1)
RBC # BLD AUTO: 5.05 MILL/UL (ref 4.7–6.1)
SODIUM SERPL-SCNC: 134 MMOL/L (ref 136–145)
WBC # BLD AUTO: 6.8 THOU/UL (ref 4.8–10.8)

## 2020-02-16 RX ADMIN — VANCOMYCIN HYDROCHLORIDE SCH MG: KIT at 18:33

## 2020-02-16 RX ADMIN — VANCOMYCIN HYDROCHLORIDE SCH MG: KIT at 05:31

## 2020-02-16 RX ADMIN — VANCOMYCIN HYDROCHLORIDE SCH MG: KIT at 13:18

## 2020-02-16 RX ADMIN — VANCOMYCIN HYDROCHLORIDE SCH MG: KIT at 00:05

## 2020-02-16 RX ADMIN — ASPIRIN 81 MG CHEWABLE TABLET SCH MG: 81 TABLET CHEWABLE at 09:20

## 2020-02-16 NOTE — PDOC.FM
- Subjective


Subjective: 


He is complaining of generalized abdominal pain that is worse in the lower 

quadrants. He says his legs are hurting. He says he has had 3 loose BM. He had 

2 episodes of vomiting.





- Objective


MAR Reviewed: Yes


Vital Signs & Weight: 


 Vital Signs (12 hours)











  Temp Pulse Resp BP BP BP Pulse Ox


 


 02/16/20 05:32  97.5 F L  97  16   116/77   94 L


 


 02/15/20 23:28  97.5 F L  79  20  115/77    92 L


 


 02/15/20 22:06   106 H     


 


 02/15/20 21:56  96.6 F L  106 H  22 H    113/80  91 L








 Weight











Admit Weight                   76.204 kg


 


Weight                         76.385 kg














I&O: 


 











 02/14/20 02/15/20 02/16/20





 06:59 06:59 06:59


 


Intake Total   410


 


Output Total  1400 


 


Balance  -1400 410











Result Diagrams: 


 02/15/20 04:25





 02/15/20 04:25


EKG Reviewed by me: Yes (Sinus rhythm 90-100s)





Phys Exam





- Physical Examination


Constitutional: NAD


HEENT: moist MMs, oral pharynx no lesions


Neck: supple, full ROM


Respiratory: no wheezing, no rales, no rhonchi, clear to auscultation bilateral


Cardiovascular: RRR, no significant murmur


Gastrointestinal: soft


Tender to palpation throughout


Musculoskeletal: pulses present


1+ pitting edema


Neurological: moves all 4 limbs


Psychiatric: normal affect


Deviation from normal: Venous stasis





Dx/Plan


(1) CHF (congestive heart failure)


Code(s): I50.9 - HEART FAILURE, UNSPECIFIED   Status: Acute   





(2) Chest pain


Code(s): R07.9 - CHEST PAIN, UNSPECIFIED   Status: Acute   





(3) Elevated troponin level


Code(s): R74.8 - ABNORMAL LEVELS OF OTHER SERUM ENZYMES   Status: Acute   





(4) Hyperbilirubinemia


Code(s): E80.6 - OTHER DISORDERS OF BILIRUBIN METABOLISM   Status: Acute   





(5) Hypertension


Code(s): I10 - ESSENTIAL (PRIMARY) HYPERTENSION   Status: Chronic   





(6) Peripheral neuropathy


Code(s): G62.9 - POLYNEUROPATHY, UNSPECIFIED   Status: Chronic   





(7) Poly-drug misuser


Code(s): F19.10 - OTHER PSYCHOACTIVE SUBSTANCE ABUSE, UNCOMPLICATED   Status: 

Chronic   





(8) Pulmonary hypertension


Code(s): I27.2 - OTHER SECONDARY PULMONARY HYPERTENSION * DO NOT USE *   Status

: Chronic   





- Plan


Plan: 


Pt is a 53 yo M with PMH of HTN, Pulm HTN, CHF, Hep C, peripheral neuropathy, 

Cor pulmonale, and BPH who presents fo chest tightness.





1. Chest Pain


Trops: 0.038 > 0.044 > 0.052


* Cardiology consulted, appreciate recs


 * No need for cath at this time, trops are his baseline


 * ECHO: EF 50-55% with I/III Diastolic dysfunction, RV overload, PAP of 65 mm 

Hg, dilated left atrium, enlarged right atrium, mitral annular calcification, 

mild MR, AV sclerosis, Severe TR, Dilated aortic root of 4 cm.


         -Will set up for sleep study outpatient for severely elevated right 

sided heart pressures


 


2. CHF


* Strict I/O, fluid restricted diet


* ASA and Metoprolol started


* Continue home meds: Lisinopril, Lasix


* prn IV lasixs for fluid build up





3. C. Diff infection


Currently on Vancomycin


* Pt states he is to complete 14 day course of Abx


* He is currently on day # 11


* Will continue precautions





4. Cor pulmonale 2/2 severe pulmonary HTN


* Will monitor for signs and symptoms


* Currently on home Lasix dose





5. RUQ Pain with Eleveated Bilirubin


TB: 2.2 DB: 1.6


* Gen Surgery consulted, appreciate recs


 * Cholelithiasis with chronic cholecystitis with gallbladder wall thickening


 * HIDA scan: patency of cystic duct


 * Will contact surgery to see what current management for the patient


* Trying to obtain records from [facility]


 


6. Transamnitis


* Will monitor 


* Hx of Hep C Cirrhosis





7. Hx polysubstance abuse


* Encourage cessation





8. Venous Stasis


Patients legs are currently only slightly edematous with trace edema





Code Status: Full


Diet: NPO


Activity: Ad Holly


DVT PPx: Lovenox





Dispo: Tele inpt, LOS > 48H. Will await Gen surg recs.

## 2020-02-16 NOTE — PRG
DATE OF SERVICE:  



SUBJECTIVE:  Please see note from Dr. Jason Fowler, for which I agree.  This

complicated gentleman, who is here, still complaining of abdominal pain, maybe a

little bit of loose stool.  Does have the Clostridium difficile, for which being he

is treated.  Initially came in with chest pain, it sounds like that has improved.

Known right-sided cardiac increased pressures, it sounds like he was told that

numerous years ago with the heart catheterization, he states maybe up to 5 years

ago.  Dr. Whitley was seeing him here and we diuresed him some.  No evidence of

failure currently.  Did vomit, unclear exactly why.  Question if he completed his

HIDA scan or not, as initially was put that he did not, but there is report that

looks like it is normal.  Does have gallstones, but Surgery did not think that this

is a surgical case or was causing his symptoms. 



PHYSICAL EXAMINATION:

CHEST:  Clear. 

CARDIOVASCULAR:  Regular rate and rhythm. 

ABDOMEN:  Just kind of diffusely mildly tender in all four quadrants.  No rebound or

guarding. 



His blood workup otherwise looks normal.  So, we will see what Cardiology says and

what Surgery said about the gallstone, but it sounds like __________ be discharged

on p.r.n. medications for nausea, vomiting, abdominal pain, and we will continue

Clostridium difficile management __________ doing anything more for him here if

Surgery does not think he is a surgical case. 







Job ID:  [id number]

## 2020-02-17 VITALS — SYSTOLIC BLOOD PRESSURE: 112 MMHG | DIASTOLIC BLOOD PRESSURE: 74 MMHG

## 2020-02-17 VITALS — TEMPERATURE: 97.3 F

## 2020-02-17 RX ADMIN — ASPIRIN 81 MG CHEWABLE TABLET SCH MG: 81 TABLET CHEWABLE at 09:38

## 2020-02-17 RX ADMIN — VANCOMYCIN HYDROCHLORIDE SCH: KIT at 05:33

## 2020-02-17 RX ADMIN — VANCOMYCIN HYDROCHLORIDE SCH MG: KIT at 11:35

## 2020-02-17 RX ADMIN — VANCOMYCIN HYDROCHLORIDE SCH MG: KIT at 05:33

## 2020-02-17 NOTE — PDOC.FM
- Subjective


Subjective: 


He has no nausea or vomiting. He is eating well. He says he has a little pain 

in his legs.





- Objective


MAR Reviewed: Yes


Vital Signs & Weight: 


 Vital Signs (12 hours)











  Temp Pulse Pulse Pulse Resp BP BP


 


 02/17/20 09:38       100/70 


 


 02/17/20 08:45    87  107 H    112/74


 


 02/17/20 07:36  97.3 F L  89    18  














  BP BP Pulse Ox


 


 02/17/20 09:38   


 


 02/17/20 08:45  93/64  


 


 02/17/20 07:36   112/74  100








 Weight











Admit Weight                   76.204 kg


 


Weight                         76.385 kg














I&O: 


 











 02/16/20 02/17/20 02/18/20





 06:59 06:59 06:59


 


Intake Total 570 1442 


 


Output Total  590 


 


Balance 570 852 











Result Diagrams: 


 02/16/20 08:05





 02/16/20 08:05





Phys Exam





- Physical Examination


Constitutional: NAD


HEENT: moist MMs, oral pharynx no lesions


Neck: supple, full ROM


Respiratory: no wheezing, no rales, no rhonchi, clear to auscultation bilateral


Cardiovascular: RRR, no significant murmur


Gastrointestinal: soft, non-tender, positive bowel sounds


Musculoskeletal: no edema, pulses present


Neurological: moves all 4 limbs


Lymphatic: no nodes


Psychiatric: normal affect


Skin: no rash, normal turgor





Dx/Plan


(1) CHF (congestive heart failure)


Code(s): I50.9 - HEART FAILURE, UNSPECIFIED   Status: Acute   





(2) Chest pain


Code(s): R07.9 - CHEST PAIN, UNSPECIFIED   Status: Acute   





(3) Elevated troponin level


Code(s): R74.8 - ABNORMAL LEVELS OF OTHER SERUM ENZYMES   Status: Acute   





(4) Hyperbilirubinemia


Code(s): E80.6 - OTHER DISORDERS OF BILIRUBIN METABOLISM   Status: Acute   





(5) Hypertension


Code(s): I10 - ESSENTIAL (PRIMARY) HYPERTENSION   Status: Chronic   





(6) Peripheral neuropathy


Code(s): G62.9 - POLYNEUROPATHY, UNSPECIFIED   Status: Chronic   





(7) Poly-drug misuser


Code(s): F19.10 - OTHER PSYCHOACTIVE SUBSTANCE ABUSE, UNCOMPLICATED   Status: 

Chronic   





(8) Pulmonary hypertension


Code(s): I27.2 - OTHER SECONDARY PULMONARY HYPERTENSION * DO NOT USE *   Status

: Chronic   





- Plan


Plan: 


Pt is a 55 yo M with PMH of HTN, Pulm HTN, CHF, Hep C, peripheral neuropathy, 

Cor pulmonale, and BPH who presents fo chest tightness.





1. Chest Pain


Trops: 0.038 > 0.044 > 0.052


* Cardiology consulted, appreciate recs


 * No need for cath at this time, trops are his baseline


 * ECHO: EF 50-55% with I/III Diastolic dysfunction, RV overload, PAP of 65 mm 

Hg, dilated left atrium, enlarged right atrium, mitral annular calcification, 

mild MR, AV sclerosis, Severe TR, Dilated aortic root of 4 cm.


         -Will set up for sleep study outpatient for severely elevated right 

sided heart pressures


 


2. CHF


* Strict I/O, fluid restricted diet


* ASA and Metoprolol started


* Continue home meds: Lisinopril, Lasix


* prn IV lasixs for fluid build up





3. C. Diff infection


Currently on Vancomycin


* Pt states he is to complete 14 day course of Abx


* He is currently on day # 12


* Will continue precautions


* Sent Abx to pharmacy





4. Cor pulmonale 2/2 severe pulmonary HTN


* Will monitor for signs and symptoms


* Currently on home Lasix dose





5. RUQ Pain with Eleveated Bilirubin


TB: 2.2 DB: 1.6


* Gen Surgery consulted, appreciate recs


 * Cholelithiasis with chronic cholecystitis with gallbladder wall thickening


 * HIDA scan: patency of cystic duct


 * Will contact surgery to see what current management for the patient


* Trying to obtain records from [facility]


 


6. Transamnitis


* Will monitor 


* Hx of Hep C Cirrhosis





7. Hx polysubstance abuse


* Encourage cessation





8. Venous Stasis


Patients legs are currently only slightly edematous with trace edema





Code Status: Full


Diet: NPO


Activity: Ad Holly


DVT PPx: Lovenox





Dispo: Tele inpt, LOS > 48H. Sent his home medications to his pharmacy in 

[place]. Will contact his PCP about his stay and told him to call and make an 

appointment.

## 2020-02-17 NOTE — CON
DATE OF CONSULTATION:  02/15/2020



ADDENDUM:  Mr. Cabral is a 54-year-old man with his history of chronic liver disease. 



The patient tolerated diet yesterday. 



HIDA scan was obtained today, which did not reveal any biliary obstruction as both

the gallbladder and small bowel were visualized. 



The patient, however, refused to complete the study with CCK to evaluate ejection

fraction. 



In the absence of clinical radiographic evidence of acute cholecystitis in this

patient with significant comorbidities, there is no further general surgical

indication for his care. 



I recommend conservative management. 



General Surgery will sign off at this time and be available to re-evaluate the

patient on demand. 







Job ID:  [id number]

## 2020-07-28 ENCOUNTER — HOSPITAL ENCOUNTER (INPATIENT)
Dept: HOSPITAL 92 - ERS | Age: 55
LOS: 6 days | Discharge: LEFT BEFORE DISCHARGE | DRG: 432 | End: 2020-08-03
Attending: FAMILY MEDICINE | Admitting: FAMILY MEDICINE
Payer: MEDICARE

## 2020-07-28 VITALS — BODY MASS INDEX: 25.9 KG/M2

## 2020-07-28 DIAGNOSIS — F15.10: ICD-10-CM

## 2020-07-28 DIAGNOSIS — B18.2: ICD-10-CM

## 2020-07-28 DIAGNOSIS — G93.41: ICD-10-CM

## 2020-07-28 DIAGNOSIS — T43.621A: ICD-10-CM

## 2020-07-28 DIAGNOSIS — N17.9: ICD-10-CM

## 2020-07-28 DIAGNOSIS — K80.20: ICD-10-CM

## 2020-07-28 DIAGNOSIS — J44.9: ICD-10-CM

## 2020-07-28 DIAGNOSIS — E16.2: ICD-10-CM

## 2020-07-28 DIAGNOSIS — I47.1: ICD-10-CM

## 2020-07-28 DIAGNOSIS — K27.9: ICD-10-CM

## 2020-07-28 DIAGNOSIS — I82.611: ICD-10-CM

## 2020-07-28 DIAGNOSIS — I13.0: ICD-10-CM

## 2020-07-28 DIAGNOSIS — L98.9: ICD-10-CM

## 2020-07-28 DIAGNOSIS — I27.20: ICD-10-CM

## 2020-07-28 DIAGNOSIS — Z79.899: ICD-10-CM

## 2020-07-28 DIAGNOSIS — K76.0: ICD-10-CM

## 2020-07-28 DIAGNOSIS — K74.69: Primary | ICD-10-CM

## 2020-07-28 DIAGNOSIS — Z90.49: ICD-10-CM

## 2020-07-28 DIAGNOSIS — I07.1: ICD-10-CM

## 2020-07-28 DIAGNOSIS — Z20.828: ICD-10-CM

## 2020-07-28 DIAGNOSIS — I50.810: ICD-10-CM

## 2020-07-28 DIAGNOSIS — K40.90: ICD-10-CM

## 2020-07-28 DIAGNOSIS — D68.4: ICD-10-CM

## 2020-07-28 DIAGNOSIS — I50.33: ICD-10-CM

## 2020-07-28 DIAGNOSIS — N18.9: ICD-10-CM

## 2020-07-28 LAB
ALBUMIN SERPL BCG-MCNC: 3.7 G/DL (ref 3.5–5)
ALP SERPL-CCNC: 69 U/L (ref 40–110)
ALT SERPL W P-5'-P-CCNC: 24 U/L (ref 8–55)
ANALYZER IN CARDIO: (no result)
ANION GAP SERPL CALC-SCNC: 23 MMOL/L (ref 10–20)
ANISOCYTOSIS BLD QL SMEAR: (no result) (100X)
APAP SERPL-MCNC: (no result) MCG/ML (ref 10–30)
APTT PPP: 39 SEC (ref 22.9–36.1)
AST SERPL-CCNC: 69 U/L (ref 5–34)
BASE EXCESS STD BLDA CALC-SCNC: -6.2 MEQ/L
BASOPHILS # BLD AUTO: 0.1 THOU/UL (ref 0–0.2)
BASOPHILS NFR BLD AUTO: 0.8 % (ref 0–1)
BILIRUB SERPL-MCNC: 7.6 MG/DL (ref 0.2–1.2)
BUN SERPL-MCNC: 21 MG/DL (ref 8.4–25.7)
CA-I BLDA-SCNC: 1.07 MMOL/L (ref 1.12–1.3)
CALCIUM SERPL-MCNC: 9.2 MG/DL (ref 7.8–10.44)
CHLORIDE SERPL-SCNC: 99 MMOL/L (ref 98–107)
CO2 SERPL-SCNC: 17 MMOL/L (ref 22–29)
CREAT CL PREDICTED SERPL C-G-VRATE: 0 ML/MIN (ref 70–130)
DRUG SCREEN CUTOFF: (no result)
EOSINOPHIL # BLD AUTO: 0.2 THOU/UL (ref 0–0.7)
EOSINOPHIL NFR BLD AUTO: 3.2 % (ref 0–10)
GLOBULIN SER CALC-MCNC: 3.2 G/DL (ref 2.4–3.5)
GLUCOSE SERPL-MCNC: 81 MG/DL (ref 70–105)
HCO3 BLDA-SCNC: 17.1 MEQ/L (ref 22–28)
HCT VFR BLDA CALC: 40 % (ref 42–52)
HGB BLD-MCNC: 14.3 G/DL (ref 14–18)
HGB BLDA-MCNC: 13.6 G/DL (ref 14–18)
INR PPP: 2
LIPASE SERPL-CCNC: 6 U/L (ref 8–78)
LYMPHOCYTES # BLD: 0.9 THOU/UL (ref 1.2–3.4)
LYMPHOCYTES NFR BLD AUTO: 11.6 % (ref 21–51)
MCH RBC QN AUTO: 27.5 PG (ref 27–31)
MCV RBC AUTO: 88.7 FL (ref 78–98)
MDIFF COMPLETE?: YES
MEDTOX CONTROL LINE VALID?: (no result)
MEDTOX READER #: (no result)
MONOCYTES # BLD AUTO: 0.8 THOU/UL (ref 0.11–0.59)
MONOCYTES NFR BLD AUTO: 10.5 % (ref 0–10)
NEUTROPHILS # BLD AUTO: 5.6 THOU/UL (ref 1.4–6.5)
NEUTROPHILS NFR BLD AUTO: 73.8 % (ref 42–75)
O2 A-A PPRESDIFF RESPIRATORY: 567.38 MM[HG] (ref 0–20)
OVALOCYTES BLD QL SMEAR: (no result) (100X)
PCO2 BLDA: 28.1 MMHG (ref 35–45)
PH BLDA: 7.4 [PH] (ref 7.35–7.45)
PLATELET # BLD AUTO: 107 THOU/UL (ref 130–400)
PO2 BLDA: 110.5 MMHG (ref 80–100)
POLYCHROMASIA BLD QL SMEAR: (no result) (100X)
POTASSIUM BLD-SCNC: 3.31 MMOL/L (ref 3.7–5.3)
POTASSIUM SERPL-SCNC: 3.3 MMOL/L (ref 3.5–5.1)
PROTHROMBIN TIME: 22.7 SEC (ref 12–14.7)
RBC # BLD AUTO: 5.19 MILL/UL (ref 4.7–6.1)
SALICYLATES SERPL-MCNC: (no result) MG/DL (ref 15–30)
SODIUM SERPL-SCNC: 136 MMOL/L (ref 136–145)
SP GR UR STRIP: 1.04 (ref 1–1.04)
SPECIMEN DRAWN FROM PATIENT: (no result)
WBC # BLD AUTO: 7.6 THOU/UL (ref 4.8–10.8)

## 2020-07-28 PROCEDURE — 82805 BLOOD GASES W/O2 SATURATION: CPT

## 2020-07-28 PROCEDURE — 36416 COLLJ CAPILLARY BLOOD SPEC: CPT

## 2020-07-28 PROCEDURE — 71275 CT ANGIOGRAPHY CHEST: CPT

## 2020-07-28 PROCEDURE — 86709 HEPATITIS A IGM ANTIBODY: CPT

## 2020-07-28 PROCEDURE — 84145 PROCALCITONIN (PCT): CPT

## 2020-07-28 PROCEDURE — 82533 TOTAL CORTISOL: CPT

## 2020-07-28 PROCEDURE — 84100 ASSAY OF PHOSPHORUS: CPT

## 2020-07-28 PROCEDURE — 82553 CREATINE MB FRACTION: CPT

## 2020-07-28 PROCEDURE — 80307 DRUG TEST PRSMV CHEM ANLYZR: CPT

## 2020-07-28 PROCEDURE — 96361 HYDRATE IV INFUSION ADD-ON: CPT

## 2020-07-28 PROCEDURE — 93005 ELECTROCARDIOGRAM TRACING: CPT

## 2020-07-28 PROCEDURE — 76705 ECHO EXAM OF ABDOMEN: CPT

## 2020-07-28 PROCEDURE — 96360 HYDRATION IV INFUSION INIT: CPT

## 2020-07-28 PROCEDURE — 83735 ASSAY OF MAGNESIUM: CPT

## 2020-07-28 PROCEDURE — 36600 WITHDRAWAL OF ARTERIAL BLOOD: CPT

## 2020-07-28 PROCEDURE — 80306 DRUG TEST PRSMV INSTRMNT: CPT

## 2020-07-28 PROCEDURE — 83880 ASSAY OF NATRIURETIC PEPTIDE: CPT

## 2020-07-28 PROCEDURE — 74175 CTA ABDOMEN W/CONTRAST: CPT

## 2020-07-28 PROCEDURE — 86140 C-REACTIVE PROTEIN: CPT

## 2020-07-28 PROCEDURE — 70450 CT HEAD/BRAIN W/O DYE: CPT

## 2020-07-28 PROCEDURE — 86780 TREPONEMA PALLIDUM: CPT

## 2020-07-28 PROCEDURE — 86705 HEP B CORE ANTIBODY IGM: CPT

## 2020-07-28 PROCEDURE — 84484 ASSAY OF TROPONIN QUANT: CPT

## 2020-07-28 PROCEDURE — 82140 ASSAY OF AMMONIA: CPT

## 2020-07-28 PROCEDURE — 87340 HEPATITIS B SURFACE AG IA: CPT

## 2020-07-28 PROCEDURE — 87635 SARS-COV-2 COVID-19 AMP PRB: CPT

## 2020-07-28 PROCEDURE — 85610 PROTHROMBIN TIME: CPT

## 2020-07-28 PROCEDURE — G0378 HOSPITAL OBSERVATION PER HR: HCPCS

## 2020-07-28 PROCEDURE — 85025 COMPLETE CBC W/AUTO DIFF WBC: CPT

## 2020-07-28 PROCEDURE — C9113 INJ PANTOPRAZOLE SODIUM, VIA: HCPCS

## 2020-07-28 PROCEDURE — 82103 ALPHA-1-ANTITRYPSIN TOTAL: CPT

## 2020-07-28 PROCEDURE — 84443 ASSAY THYROID STIM HORMONE: CPT

## 2020-07-28 PROCEDURE — 78226 HEPATOBILIARY SYSTEM IMAGING: CPT

## 2020-07-28 PROCEDURE — 87522 HEPATITIS C REVRS TRNSCRPJ: CPT

## 2020-07-28 PROCEDURE — 72191 CT ANGIOGRAPH PELV W/O&W/DYE: CPT

## 2020-07-28 PROCEDURE — 93010 ELECTROCARDIOGRAM REPORT: CPT

## 2020-07-28 PROCEDURE — 82570 ASSAY OF URINE CREATININE: CPT

## 2020-07-28 PROCEDURE — 36415 COLL VENOUS BLD VENIPUNCTURE: CPT

## 2020-07-28 PROCEDURE — 84300 ASSAY OF URINE SODIUM: CPT

## 2020-07-28 PROCEDURE — 82550 ASSAY OF CK (CPK): CPT

## 2020-07-28 PROCEDURE — 74177 CT ABD & PELVIS W/CONTRAST: CPT

## 2020-07-28 PROCEDURE — A9537 TC99M MEBROFENIN: HCPCS

## 2020-07-28 PROCEDURE — 80053 COMPREHEN METABOLIC PANEL: CPT

## 2020-07-28 PROCEDURE — 85652 RBC SED RATE AUTOMATED: CPT

## 2020-07-28 PROCEDURE — 81003 URINALYSIS AUTO W/O SCOPE: CPT

## 2020-07-28 PROCEDURE — 83690 ASSAY OF LIPASE: CPT

## 2020-07-28 PROCEDURE — 87389 HIV-1 AG W/HIV-1&-2 AB AG IA: CPT

## 2020-07-28 PROCEDURE — 85730 THROMBOPLASTIN TIME PARTIAL: CPT

## 2020-07-28 PROCEDURE — U0003 INFECTIOUS AGENT DETECTION BY NUCLEIC ACID (DNA OR RNA); SEVERE ACUTE RESPIRATORY SYNDROME CORONAVIRUS 2 (SARS-COV-2) (CORONAVIRUS DISEASE [COVID-19]), AMPLIFIED PROBE TECHNIQUE, MAKING USE OF HIGH THROUGHPUT TECHNOLOGIES AS DESCRIBED BY CMS-2020-01-R: HCPCS

## 2020-07-28 PROCEDURE — 94640 AIRWAY INHALATION TREATMENT: CPT

## 2020-07-28 PROCEDURE — 71045 X-RAY EXAM CHEST 1 VIEW: CPT

## 2020-07-28 NOTE — RAD
CHEST ONE VIEW:

7/28/20

 

HISTORY: 

Hypoglycemia.

 

COMPARISON: 

2/14/20.

 

FINDINGS: 

Heart size is enlarged. No pneumothorax. No effusion. No confluent air space consolidation. No acute 
osseous abnormality.

 

IMPRESSION: 

Cardiomegaly. Otherwise unremarkable exam. 

 

POS: HOME

## 2020-07-28 NOTE — PDOC.FPRHP
- History of Present Illness


Chief Complaint: AMS


History of Present Illness: 





55-year-old male presents the ED by EMS complaining of abdominal pain. Per ED, 

EMS reports patient was unresponsive upon their arrival D stick was 16. Patient 

was given D10 and thiamine, D stick was 104 after intervention. Patient was a 

poor historian but reports left lower quadrant abdominal pain persisting 

intermittently for 2 weeks. Patient states he is a bulging to the area.  Last 

bowel movement was this morning and he reports blood streaked stools. He denies 

taking any medication before this episode. He reports active methamphetamine 

use with last use last week. Patient denies NVD, fevers, chills, urinary 

complaints or testicular pain/swelling. He endorses a cough with sputum 

production that has been persistent for the last couple weeks. 


ED Course: 





started on D5 LR, imaging of chest, a/p, head performed.





- Allergies/Adverse Reactions


 Allergies











Allergy/AdvReac Type Severity Reaction Status Date / Time


 


No Known Drug Allergies Allergy   Verified 02/14/20 20:40














- Home Medications


 











 Medication  Instructions  Recorded  Confirmed  Type


 


Acetaminophen [Tylenol Regular 650 mg PO Q4H PRN  tab 06/14/19 07/28/20 Rx





Strength]    


 


Tamsulosin HCl 1 tab PO DAILY 02/14/20 07/28/20 History


 


Aspirin 81 mg PO DAILY 30 Days #30 tab.chew 02/16/20 07/28/20 Rx


 


Furosemide [Lasix] 20 mg PO BID #60 tablet 02/16/20 07/28/20 Rx


 


Lisinopril 10 mg PO BID 30 Days #60 tablet 02/16/20 07/28/20 Rx


 


Gabapentin [Neurontin] 100 mg PO TID 07/28/20 07/28/20 History














- History


PMHx: R sided HF, CKD, Substance abuse, HTN, HCV, PUD


 


PSHx: appendectomy





FHx: noncontributory


 


Social: uses methamphetamines, last use last week, denies alcohol and tobacco 

use


 








- Review of Systems


General: denies: fever/chills, weight/appetite/sleep changes


Eyes: denies: eye pain, vision changes


ENT: denies: nasal congestion, rhinorrhea


Respiratory: reports: cough, shortness of breath


Cardiovascular: reports: edema.  denies: chest pain, palpitation


Gastrointestinal: reports: abdominal pain, GI bleeding.  denies: nausea, 

vomiting, diarrhea


Genitourinary: denies: incontinence, dysuria


Skin: denies: rashes, lesions


Musculoskeletal: denies: pain, tenderness


Neurological: reports: syncope.  denies: numbness





- Vital signs


BP: 128/89, MAP: 102, Pulse: 107, Resp: 17, Pain: 0, O2 sat: 100 on (Room Air) 

Wt: 90.7 kg





- Physical Exam


Constitutional: well developed


HEENT: normocephalic and atraumatic, PERRLA


Neck: supple, FROM


Heart: RRR, normal S1/S2


Lungs: CTAB, no respiratory distress


Abdomen: soft


-Abdomen: 





Tender to palpation diffusely with voluntary guarding, no rebound tenderness


Musculoskeletal: normal structure, normal tone


Neurological: no focal deficit, CN II-XII intact


-Skin: 





Jaundice of abdomen and head, abrasions on abdomen, ulcerated 2 cm growth on 

back


-Psychiatric: 





tired, arousable





FMR H&P: Results





- Labs


Result Diagrams: 


 07/29/20 19:44





 07/29/20 03:50


Lab results: 


 











WBC  7.6 thou/uL (4.8-10.8)   07/28/20  15:12    


 


Hgb  14.3 g/dL (14.0-18.0)   07/28/20  15:12    


 


Hct  46.1 % (42.0-52.0)   07/28/20  15:12    


 


MCV  88.7 fL (78.0-98.0)   07/28/20  15:12    


 


Plt Count  107 thou/uL (130-400)  L  07/28/20  15:12    


 


Neutrophils %  73.8 % (42.0-75.0)   07/28/20  15:12    


 


ABG pH  7.40  (7.35-7.45)   07/28/20  17:00    


 


ABG pCO2  28.1 mmHg (35.0-45.0)  L  07/28/20  17:00    


 


ABG pO2  110.5 mmHg (80.0-100.0)  H  07/28/20  17:00    


 


Sodium  136 mmol/L (136-145)   07/28/20  15:12    


 


Potassium  3.3 mmol/L (3.5-5.1)  L  07/28/20  15:12    


 


Chloride  99 mmol/L ()   07/28/20  15:12    


 


Carbon Dioxide  17 mmol/L (22-29)  L  07/28/20  15:12    


 


BUN  21 mg/dL (8.4-25.7)   07/28/20  15:12    


 


Creatinine  1.54 mg/dL (0.7-1.3)  H  07/28/20  15:12    


 


Glucose  81 mg/dL ()   07/28/20  15:12    


 


Calcium  9.2 mg/dL (7.8-10.44)   07/28/20  15:12    


 


Total Bilirubin  7.6 mg/dL (0.2-1.2)  H  07/28/20  15:12    


 


AST  69 U/L (5-34)  H  07/28/20  15:12    


 


ALT  24 U/L (8-55)   07/28/20  15:12    


 


Alkaline Phosphatase  69 U/L ()   07/28/20  15:12    


 


Ammonia  34 umol/L (18-72)   07/28/20  16:20    


 


Serum Total Protein  6.9 g/dL (6.0-8.3)   07/28/20  15:12    


 


Albumin  3.7 g/dL (3.5-5.0)   07/28/20  15:12    


 


Lipase  6 U/L (8-78)  L  07/28/20  15:12    








troponin 0.042


BNP 1210





CK 0.77 


Drug screen + for amphetamines





- EKG Interpretation


EKG: 





no evidence of acute ischemia





- Radiology Interpretation


  ** CT scan - abdomen


Status: report reviewed by me


Additional comment: 





PUD, no obvious perforation, evidence for cholecystitis





  ** CT scan - chest


Status: report reviewed by me


Additional comment: 





no evidence of PE, small R pleural effusion, evidence of R HF





  ** Chest x-ray


Status: report reviewed by me


Additional comment: 





Cardiomegaly, no acute processes





  ** CT scan - head


Status: report reviewed by me


Additional comment: 





No acute intracranial processes, no hemorrhage





FMR H&P: A/P





- Plan





Encephalopathy:


-Likely 2/2 hypoglycemia vs substance abuse


- CTH negative


- monitor BG


- monitor for withdrawal sx


- COVID test


- AM serum cortisol


- TSH, CK, CPK, 





Cholecystitis


- CT abdomen with concern, f/u US showing cholelithiasis possible cholecystitis


- cefepime


- flagyl





HF


- Echo 2/20 showing EF 50-55%, severe TR, pulmonary HTN


- Restart Home Lasix


- recheck BNP





HTN


- Home meds





Pulmonary HTN


- Maintain O2 sats, PRN O2


- consider consult to palliative





HCV


- Monitor LFT





Skin lesion


- Likely has BCC on back, possible removal/biopsy





DVT ppx


- SCD


GI PPX: Protonix gtt


Code: full


Disposition/LOS: 





Admitted for encephalopathy and cholecystitis, LOS > 48 hrs





FMR H&P: Upper Level





- Plan


Date/Time: 07/28/20 1823








[facility] BURGER DO, have evaluated this patient and agree with findings/plan 

as outlined by intern resident. Pertinent changes/additions are listed here.





This is a 56 yo male with a pmh of HFpEF, Hepatitis C, venous stasis, hx of C 

diff who presents to the ED via EMS with hypoglycemia of 16. He was found 

unresponsive. He received D10 and this improved his glucose level. His last 

check in the ER was 116 after no interventions. He does not appear to take any 

oral hyperglycemic medicationsHe states that he has been having an ongoing 

cough for the last 2 weeks. In addition, he has been having pain in his lower 

left groin and states he has been having issues with a hernia. He states that 

he has to add pressure to his groin when he coughs to prevent it from bulging 

more. He denies hematemesis but reports possibly having bloody stools.








Objective:


BP: 128/89, , RR 17, Temp 97.5, SpO2 92Ra, Wt 91kg


General: mild distress, lethargic


HEENT: AT/NC


Cardio: RRR, no murmur


Respiratory: CTAB, no rales or rhonchi


Extremities: Diminished pulses in BLE, chronic skin changes consistent with 

venous stasis











A/P


Acute metabolic encephalopathy 2/2 Hypoglycemia vs other etiology


-Admit to tele inpatient


-No obvious cause at this point


-Q2hr glucose checks


-Pt received 1L NS in ER


-Continue D5 LR at 100 x1L


-Pending AM TSH/cortisol level





Hepatitis C


-Ammonia level 34


-Cholelithiasis with signs of cholecystitis


-Pending ultrasound to further investigate gall stones


-Meld-Na score of 27, although no evidence of cirrhosis on imaging


-Minimal free fluid in abdomen, Starting cefepime and metronidazole for 

prophylaxis


-Consider GI consult in AM





Hepatic Steatosis





BEVERLY


-Will obtain FeNa, possibly due to hepatic congestion vs dehydration





Peptic ulcer disease


-Protonix and carafate


-Hgb stable and MCV not suggestive of chronic bleed





Coagulopathy likely 2/2 liver disease


-INR 2.0


-Will monitor for bleeding





Venous stasis





Pulmonary HTN


-Wears 2L O2 at home appears at baseline, ABG shows adequate pO2





HFpEF


-Echo on 02/2020 EF 50-55%, Grade 1/3 diastolic dysfunction


-Will limit IV fluids


-CTA shows RV enlargement, no PEs





hx of C diff


-Denies current diarrhea





Inguinal hernia


-Outpt Gen Surgery consultation


-CT abdomen/pelvis makes no mention of hernia





Hyperbilirubinemia


-Likely 2/2 hepatic steatosis


-Above baseline





IV methamphetamine abuse


-UDS, encourage cessation





Code: Full


Prophylaxis: SCDs


Family: None at bedside


Fluids: D5 LR 100ml/hr


Diet: Clear liquids


Disposition: DC in 2-3 days


PCP: Dr. Burger





Addendum - Attending





- Attending Attestation


Date/Time: 07/29/20 1941





I personally evaluated the patient and discussed the management with Dr. Castaneda 

and Dr. Estrella


I agree with the History, Examination, Assessment and Plan documented above 

with any addition or exceptions noted below.





56 yo male with multiple medical conditions and poor social situation presents 

to ER by EMS after being found down with severe hypoglycemia.





- Metabolic encephalopathy due to hypoglycemia and methamphetamine use. Has 

responded to IV glucose. Continue IV drip with D5LR. UDS pos. Patient reports 

frequent use. Patient with liver dz and likely low glucagon reserve. Continue q 

2 hour CHO checks. 


- Hep C with liver failure. Untreated. Unsure stage of cirrhosis. Ammonia WNL. 

CKD present along with pEFHF. At this time does not appear to have hepato-renal 

syndrome but monitor. Currently on IVFs. Trend labs. Continue home meds. Notify 

GI as needed.  


- Cholecystitis: Gen surg in AM. Start antibx. 


- PUD: Start PPI. Add carafate as needed to help pain control. Avoid NSAIDs. GI 

in AM. 


Adjust home meds as needed. Monitor closely throughout the night. 





Nicola

## 2020-07-28 NOTE — ULT
EXAM: US Gallbladder RUQ



CLINICAL HISTORY: Hyper bilirubinemia.



COMPARISON: 2/14/2020

Correlation: Abdomen pelvis CT 7/28/2020                  



FINDINGS:



Pancreas:  Obscured by bowel gas



Liver:Increased echogenicity due to hepatic steatosis.  Right hepatic lobe: 16.1 cm



Gallbladder: Contracted gallbladder. Sonographic evidence of cholelithiasis. Gallbladder wall is thic
kened measuring 0.42 cm.

Tejeda's sign:Positive



Portal Vein: Patent. Bidirectional flow



Bile ducts: 0.4 cm common bile duct diameter





Right kidney: No hydronephrosis. Right kidney measures 10.2 cm in length.



Incidentals: Small amount of perihepatic fluid. Right pleural effusion.

IMPRESSION:





1. Sonographic evidence of cholelithiasis and cholecystitis.



Reported By: Lavern Nunez 

Electronically Signed:  7/28/2020 11:18 PM

## 2020-07-28 NOTE — CT
Exam: CT angiogram of the chest



HISTORY: Unresponsive patient. Hypoglycemia. Abdominal pain.



COMPARISON: 1/10/2015



TECHNIQUE: CT angiogram of the chest is performed in the axial plane. Three-dimensional reformatted i
mages are submitted for interpretation



FINDINGS:

Limited evaluation due to motion degradation

Mediastinum: No mass, lymphadenopathy or hematoma.



HEART: Cardiomegaly. Stable moderate pericardial effusion along the left heart border. The right atri
um and right ventricle are prominent. There is reflux of contrast into the vena cava and hepatic

vein suggesting right heart failure.

Aorta: Limited evaluation due to lack of adequate contrast opacification and due to motion degradatio
n

Upper solid abdominal viscera: Mild edematous change in the mesentery suggesting the epigastric regio
n. Refer to separate abdomen CT report for further detail. CT evidence of cholelithiasis

Trachea and central bronchi: Patent

Pleural spaces: Small right pleural effusion.

Lung parenchyma: Right lower lobe passive atelectasis adjacent to pleural effusion. No suspicious mas
ses or consolidation. Lung. Minimal atelectatic change in the left upper lobe.

Pneumothorax: None

Osseous structures: No lytic or blastic lesions



Pulmonary arteries:Limited evaluation of the pulmonary arterial system to the level of the lobar angel
silvia due to motion degradation. No filling defect to suggest thromboembolism. Segmental and

subsegmental arteries cannot be adequately assessed





IMPRESSION:

1. Limited evaluation due to motion degradation

2. No evidence of pulmonary artery embolism to the level of the lobar arteries

3. Cardiomegaly with pericardial fluid. Small right-sided pleural effusion. Correlate for congestive 
heart failure

4. Right heart failure with reflux of contrast. There is dilatation of the right atrium and right alfonso
tricle.



Reported By: Lavern Nunez 

Electronically Signed:  7/28/2020 5:15 PM

## 2020-07-28 NOTE — CT
EXAM:  CT ABDOMEN AND PELVIS



HISTORY: Hypoglycemic patient. Unresponsive. Abdominal pain.



COMPARISON: 4/23/2019



Procedure: 



Multiple contiguous axial images were obtained and a CT of the abdomen and pelvis with IV contrast. C
oronal reformats were performed.



FINDINGS:



Lower Chest: Small right-sided effusion with adjacent passive atelectasis

Vessels: Normal caliber aorta. 

Heart: Cardiomegaly with pericardial fluid. Dilatation of the right atrium and right ventricle. Reflu
x of contrast suggesting right heart failure

Abdomen:

Portal vein:Patent

Gallbladder: Cholelithiasis. Mild stranding and edema. Correlate for cholecystitis with right upper q
uadrant ultrasound

Liver: Diffuse hypoattenuation suggesting hepatic steatosis. No enhancing liver masses

Pancreas: Atrophic with decreased parenchymal volume in the head and body of the pancreas.

Spleen: within normal limits.

Adrenals: within normal limits.

Kidneys: Symmetric enhancement. No obstructive uropathy.

Peritoneum: There is straightening of the central abdominal mesentery predominantly in the epigastric
 region. Small amount of fluid is suggested in both pericolic gutters. There is no mass or free

air. Small amount of perihepatic fluid.

Bowel: Limited evaluation by the lack of oral contrast. There does appear to be mild mucosal thickeni
ng involving the gastric antrum and duodenum. There is mild hyperemia. Correlate for peptic ulcer

disease. Multiple normal caliber small bowel loops. Normal ileocecal junction. Mucosal thickening of 
the ascending colon. Correlate for colitis. Scattered fecal material in nondistended, nondilated

left hemicolon. Diverticulosis, without evidence of diverticulitis. Appendix is not appreciated. No s
econdary signs of appendicitis.

Mesentery and Retroperitoneum: No enlarged mesenteric or retroperitoneal lymph nodes.

Abdominal Wall: Scattered edema suggesting component of anasarca



Pelvis:

Reproductive Organs: Nonenlarged prostate gland

Pelvis: No mass, lymphadenopathy, free air or free fluid. 

Bladder: within normal limits.



Bones: within normal limits.  



IMPRESSION:





1. Cardiomegaly. Right heart failure.

2. Right-sided effusion.

3. CT evidence of cholelithiasis without definite evidence of cholecystitis.

4. Standing abdominal mesentery centrally along with small amount of right perihepatic fluid. Correla
te for edematous change possibly secondary to peptic ulcer disease involving the gastric antrum and

duodenum. No evidence of definite free air or perforation.

5. Mucosal thickening involving the ascending colon, nonspecific. Correlate for colitis.



Reported By: Lavern Nunez 

Electronically Signed:  7/28/2020 5:23 PM

## 2020-07-28 NOTE — CT
CT BRAIN WITHOUT CONTRAST:



HISTORY: Altered mental status



FINDINGS:

No evidence of acute infarct, hemorrhage, midline shift or abnormal extra-axial fluid collections is 
seen. The ventricular size is appropriate and the basilar cisterns are patent. The bony calvarium

is intact. The visualized paranasal sinuses and mastoid air cells are well aerated.



IMPRESSION: No CT evidence of acute intracranial process.



Reported By: Griffin Gary 

Electronically Signed:  7/28/2020 4:53 PM

## 2020-07-29 LAB
ALBUMIN SERPL BCG-MCNC: 3.5 G/DL (ref 3.5–5)
ALP SERPL-CCNC: 71 U/L (ref 40–110)
ALT SERPL W P-5'-P-CCNC: 28 U/L (ref 8–55)
ANION GAP SERPL CALC-SCNC: 18 MMOL/L (ref 10–20)
AST SERPL-CCNC: 73 U/L (ref 5–34)
BASOPHILS # BLD AUTO: 0.1 THOU/UL (ref 0–0.2)
BASOPHILS NFR BLD AUTO: 1.4 % (ref 0–1)
BILIRUB SERPL-MCNC: 6.6 MG/DL (ref 0.2–1.2)
BUN SERPL-MCNC: 20 MG/DL (ref 8.4–25.7)
CALCIUM SERPL-MCNC: 9.2 MG/DL (ref 7.8–10.44)
CHLORIDE SERPL-SCNC: 103 MMOL/L (ref 98–107)
CK SERPL-CCNC: 545 U/L (ref 30–200)
CO2 SERPL-SCNC: 19 MMOL/L (ref 22–29)
CREAT CL PREDICTED SERPL C-G-VRATE: 82 ML/MIN (ref 70–130)
CRP SERPL-MCNC: 0.77 MG/DL
EOSINOPHIL # BLD AUTO: 0.9 THOU/UL (ref 0–0.7)
EOSINOPHIL NFR BLD AUTO: 13.9 % (ref 0–10)
GLOBULIN SER CALC-MCNC: 3.3 G/DL (ref 2.4–3.5)
GLUCOSE SERPL-MCNC: 91 MG/DL (ref 70–105)
HBCM INDEX: 0.18 S/CO (ref 0–0.79)
HBSAG INDEX: 0.2 S/CO (ref 0–0.99)
HEP A IGM S/CO: 0.25 S/CO (ref 0–0.79)
HGB BLD-MCNC: 13.6 G/DL (ref 14–18)
HGB BLD-MCNC: 14 G/DL (ref 14–18)
HIV 1/2 INDEX: 0.54 S/CO (ref ?–1)
LYMPHOCYTES # BLD: 1.3 THOU/UL (ref 1.2–3.4)
LYMPHOCYTES NFR BLD AUTO: 20.6 % (ref 21–51)
MCH RBC QN AUTO: 26.9 PG (ref 27–31)
MCV RBC AUTO: 90.4 FL (ref 78–98)
MONOCYTES # BLD AUTO: 0.8 THOU/UL (ref 0.11–0.59)
MONOCYTES NFR BLD AUTO: 12 % (ref 0–10)
NEUTROPHILS # BLD AUTO: 3.4 THOU/UL (ref 1.4–6.5)
NEUTROPHILS NFR BLD AUTO: 52.2 % (ref 42–75)
PLATELET # BLD AUTO: 86 THOU/UL (ref 130–400)
PLATELET # BLD AUTO: 89 THOU/UL (ref 130–400)
POTASSIUM SERPL-SCNC: 3.5 MMOL/L (ref 3.5–5.1)
RBC # BLD AUTO: 5.22 MILL/UL (ref 4.7–6.1)
SODIUM SERPL-SCNC: 136 MMOL/L (ref 136–145)
SODIUM UR-SCNC: (no result) MMOL/L
SYPHILIS ANTIBODY INDEX: 0.07 S/CO
TROPONIN I SERPL DL<=0.01 NG/ML-MCNC: 0.04 NG/ML (ref ?–0.03)
TROPONIN I SERPL DL<=0.01 NG/ML-MCNC: 0.05 NG/ML (ref ?–0.03)
TROPONIN I SERPL DL<=0.01 NG/ML-MCNC: 0.05 NG/ML (ref ?–0.03)
WBC # BLD AUTO: 6.4 THOU/UL (ref 4.8–10.8)

## 2020-07-29 RX ADMIN — Medication SCH: at 21:41

## 2020-07-29 RX ADMIN — Medication SCH ML: at 09:00

## 2020-07-29 RX ADMIN — METRONIDAZOLE SCH MLS: 500 INJECTION, SOLUTION INTRAVENOUS at 01:30

## 2020-07-29 RX ADMIN — METRONIDAZOLE SCH MLS: 500 INJECTION, SOLUTION INTRAVENOUS at 08:37

## 2020-07-29 NOTE — PRG
DATE OF SERVICE:  07/29/2020



Mr. Cabral is a 55-year-old man, admitted yesterday with abdominal pain as well as

being found unresponsive at home.  At that time, a blood glucose stick was noted to

be 16.  He was given D10 with levels of 104 afterwards.  He was subsequently

admitted for further evaluation of his several problems including the abdominal

pain. 



A gallbladder ultrasound revealed a positive sonographic Tejeda sign with gallstones

and there is a suspicion for cholecystitis.  Surgery was consulted and felt that his

pain was due to hepatomegaly secondary to cor pulmonale and did not believe that

surgery was indicated.  They did, however, order a HIDA scan, though in the past,

his HIDA scans have not shown cholecystitis. 



In the event, this morning, he looks and feels better, although slightly sleepy. 



Mr. Cabral also has a history of untreated hepatitis C.  We have recommended that

after his acute problems have been dealt with that, he consider treatment for

hepatitis C and he appears to be in agreement. 



Looking at his labs, his CBC shows a white count of 7600, his hemoglobin was 14.3,

his hematocrit was 46.1 with an MCV of 88.  An arterial blood gas shows a pH of

7.40, a pCO2 of 28, a pO2 of 110.  Chemistries; sodium was 136, potassium 3.5,

chloride 103, bicarb 19, BUN 20, creatinine 1.3.  His total bilirubin was 6.6 with

an AST of 73.  His BNP was 1362.  Troponin was indeterminate at 0.052. 



As stated, this morning, he looks and feels better.  We will continue to evaluate

and treat as well as monitor him closely for any further episodes of hypoglycemia. 







Job ID:  [id number]
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
## 2020-07-29 NOTE — NM
Radionucleotide hepatobiliary scan



HISTORY: Cholecystitis.



FINDINGS: Early images show physiologic uptake of radiotracer throughout the hepatic parenchyma. Gall
bladder first imaged at 29 minutes. At 60 minutes, uptake was not reliably demonstrated in the

small bowel.



Image at 2.5 hours shows radiotracer in the small bowel.



  



IMPRESSION :

No evidence of biliary obstruction.



Reported By: EMILY Madison 

Electronically Signed:  7/29/2020 4:26 PM

## 2020-07-29 NOTE — PDOC.FM
- Subjective


Subjective: 





Patient is resting comfortably in bed. He complains of diffuse abdominal pain 

and is wanting to eat. He also noticed a diffuse rash on his abdomen and groin 

that started last night. Denies CP, SOB, HA, changes in vision. 





- Objective


MAR Reviewed: Yes


Vital Signs & Weight: 


 Vital Signs (12 hours)











  Temp Pulse Resp BP Pulse Ox


 


 07/29/20 03:46  98.2 F  119 H  18  115/77  93 L


 


 07/29/20 00:00   112 H  20  121/81  94 L


 


 07/28/20 21:00  97.9 F  109 H  20  131/81  93 L








 Weight











Weight                         91.626 kg














Result Diagrams: 


 07/29/20 03:50





 07/29/20 03:50





Phys Exam





- Physical Examination


Constitutional: NAD


HEENT: sclera anicteric


Neck: full ROM


Respiratory: no wheezing, clear to auscultation bilateral


Cardiovascular: RRR, no significant murmur


Gastrointestinal: soft, no distention, positive bowel sounds


diffusely tender to palpation. Positive hough's sign


Musculoskeletal: no edema


Neurological: moves all 4 limbs


Psychiatric: A&O x 3


Skin: normal turgor


Deviation from normal: jaundiced. Midline skin lesion on his upper back aprx 

1.6onh3mg. 


-: diffuse macular rash on abdomen and groin





Dx/Plan





- Plan


Plan: 





Encephalopathy:


-Likely 2/2 hypoglycemia vs substance abuse


- CT head negative


- monitor BG


- monitor for withdrawal sx


- COVID test pending


- AM serum cortisol


- TSH, 4.3357


- UDS positive





Cholecystitis


- CT abdomen with concern, f/u US showing cholelithiasis and cholecystitis


- Surgery consulted, appreciate the recs





Possible Liver Cirrhosis


- extensive history of drug use, Hx of Hep C


- AST 73, ALT 28, Platelets 86, prolonged PT/PTT, hypoglycemia


- prophylaxis against SBP: cefepime, flagyl


-GI consulted, appreciate the recs





Elevated Trops


-denies CP


- .052, will trend





HF


- Echo 2/20 showing EF 50-55%, severe TR, pulmonary HTN


- Restart Home Lasix


- recheck BNP





HTN


- Home meds





Pulmonary HTN


- Maintain O2 sats, PRN O2


- consider consult to palliative





HCV


- Monitor LFT


-GI consulted, appreciate the recs





Skin lesion


- Likely has BCC on back, possible removal/biopsy





Substance Use


-IV meth last use 2 weeks ago


- UDS + for methamphetamines and amphetamines





BEVERLY


-Cr on admission 1.54


-today Cr is 1.3


- FeNa .2%, pre renal


- on MIVF


-continue to monitor








IVF: 125mL/hr LR


DVT ppx: SCD


GI PPX: Protonix gtt


Code: full


Disposition/LOS: 





Admitted for encephalopathy and cholecystitis, LOS > 48 hrs

## 2020-07-30 LAB
ALBUMIN SERPL BCG-MCNC: 3.3 G/DL (ref 3.5–5)
ALP SERPL-CCNC: 73 U/L (ref 40–110)
ALT SERPL W P-5'-P-CCNC: 29 U/L (ref 8–55)
ANION GAP SERPL CALC-SCNC: 14 MMOL/L (ref 10–20)
AST SERPL-CCNC: 73 U/L (ref 5–34)
BASOPHILS # BLD AUTO: 0.1 THOU/UL (ref 0–0.2)
BASOPHILS NFR BLD AUTO: 1 % (ref 0–1)
BILIRUB SERPL-MCNC: 6.1 MG/DL (ref 0.2–1.2)
BUN SERPL-MCNC: 18 MG/DL (ref 8.4–25.7)
CALCIUM SERPL-MCNC: 8.4 MG/DL (ref 7.8–10.44)
CHLORIDE SERPL-SCNC: 103 MMOL/L (ref 98–107)
CO2 SERPL-SCNC: 20 MMOL/L (ref 22–29)
CREAT CL PREDICTED SERPL C-G-VRATE: 99 ML/MIN (ref 70–130)
EOSINOPHIL # BLD AUTO: 1.8 THOU/UL (ref 0–0.7)
EOSINOPHIL NFR BLD AUTO: 21.9 % (ref 0–10)
GLOBULIN SER CALC-MCNC: 3.2 G/DL (ref 2.4–3.5)
GLUCOSE SERPL-MCNC: 113 MG/DL (ref 70–105)
HGB BLD-MCNC: 14.1 G/DL (ref 14–18)
LYMPHOCYTES # BLD: 1.4 THOU/UL (ref 1.2–3.4)
LYMPHOCYTES NFR BLD AUTO: 17.1 % (ref 21–51)
MCH RBC QN AUTO: 28.1 PG (ref 27–31)
MCV RBC AUTO: 88.8 FL (ref 78–98)
MONOCYTES # BLD AUTO: 0.7 THOU/UL (ref 0.11–0.59)
MONOCYTES NFR BLD AUTO: 8.4 % (ref 0–10)
NEUTROPHILS # BLD AUTO: 4.4 THOU/UL (ref 1.4–6.5)
NEUTROPHILS NFR BLD AUTO: 51.7 % (ref 42–75)
PLATELET # BLD AUTO: 103 THOU/UL (ref 130–400)
POTASSIUM SERPL-SCNC: 3.4 MMOL/L (ref 3.5–5.1)
RBC # BLD AUTO: 5.02 MILL/UL (ref 4.7–6.1)
SODIUM SERPL-SCNC: 134 MMOL/L (ref 136–145)
WBC # BLD AUTO: 8.4 THOU/UL (ref 4.8–10.8)

## 2020-07-30 RX ADMIN — Medication SCH ML: at 09:13

## 2020-07-30 RX ADMIN — Medication SCH ML: at 21:14

## 2020-07-30 NOTE — PRG
DATE OF SERVICE:  07/30/2020



Mr. Cabral looks and feels better this morning.  He has been receiving quite a bit of

fluids over the last day due to his condition.  We gave him Lasix as he demonstrated

some mild fluid overload and he already feels much improved.  He was seen in

consultation by the Surgery Service, who did not feel the patient had acute

cholecystitis.  They felt that his right upper quadrant discomfort was due to

hepatomegaly secondary to cor pulmonale.  Also in the past, he has had several

negative HIDA scans.  He is therefore ready for discharge.  We emphasized again to

him to please follow up with GI for treatment of his hepatitis C. 







Job ID:  [id number]

## 2020-07-30 NOTE — PDOC.FM
- Subjective


Subjective: 





Patient states he feels crummy and wants his Lasix. He states he feels really 

puffy in his abdomen/back. Denies CP, SOB, abdominal pain, HA. Tolerating PO no 

difficulty.





- Objective


MAR Reviewed: Yes


Vital Signs & Weight: 


 Vital Signs (12 hours)











  Temp Pulse Resp BP BP BP Pulse Ox


 


 07/30/20 04:00  97.9 F  116 H  24 H   122/79   92 L


 


 07/30/20 00:00  98.9 F  118 H  24 H   113/58 L   92 L


 


 07/29/20 19:31        92 L


 


 07/29/20 19:15  98.3 F  117 H  24 H  78/48 L  100/64  78/52 L  92 L








 Weight











Weight                         96.162 kg














I&O: 


 











 07/28/20 07/29/20 07/30/20





 06:59 06:59 06:59


 


Intake Total   2200


 


Output Total   300


 


Balance   1900











Result Diagrams: 


 07/30/20 10:06





 07/30/20 10:06





Phys Exam





- Physical Examination


Constitutional: NAD


HEENT: sclera anicteric


Neck: full ROM


Respiratory: no wheezing, clear to auscultation bilateral


Cardiovascular: RRR, no significant murmur


Gastrointestinal: non-tender, positive bowel sounds


mildly distended


Musculoskeletal: pulses present


2+ pitting edema. Venous stasis present.


Neurological: moves all 4 limbs


Psychiatric: normal affect, A&O x 3


Skin: normal turgor


Deviation from normal: slightly jaundiced.





Dx/Plan





- Plan


Plan: 





Encephalopathy:


-Likely 2/2 hypoglycemia vs substance abuse


- CT head negative


- monitor BG


- monitor for withdrawal sx


- COVID test neg


- TSH, 4.3357


- UDS positive meth





Cholelithiasis


- CT abdomen with concern, f/u US showing cholelithiasis and cholecystitis


- Surgery consulted, appreciate the recs


- HIDA scan negative, non operable. Likely falsely positive hough's sign due 

to painfully tender congested liver





Possible Liver Cirrhosis


- extensive history of drug use, Hx of Hep C


- AST 73, ALT 28, Platelets 86, prolonged PT/PTT, hypoglycemia


- prophylaxis against SBP: cefepime, flagyl discontinued


-GI consulted, appreciate the recs. Plan to follow up outpatient





Elevated Trops


-denies CP


- trended down





Acute on chronic HF exacerbation


- Echo 2/20 showing EF 50-55%, severe TR, pulmonary HTN


- IV Lasix 40mg mEq PRN


- home Lasix


- recheck BNP





HTN


- Home meds





Pulmonary HTN


- Maintain O2 sats, PRN O2


- consider consult to palliative





HCV


- Monitor LFT


-GI consulted, appreciate the recs. Plan to follow up outpatient.





Skin lesion


- Likely has BCC on back, possible removal/biopsy





Substance Use


-IV meth last use 2 weeks ago


- UDS + for methamphetamines and amphetamines





BEVERLY, improving


-Cr on admission 1.54


- FeNa .2%, pre renal


-continue to monitor








IVF: KVO


DVT ppx: SCD


GI PPX: Protonix gtt


Code: full


Disposition/LOS: inpatient, >48 hours

## 2020-07-31 LAB
ALBUMIN SERPL BCG-MCNC: 3.1 G/DL (ref 3.5–5)
ALP SERPL-CCNC: 85 U/L (ref 40–110)
ALT SERPL W P-5'-P-CCNC: 28 U/L (ref 8–55)
ANION GAP SERPL CALC-SCNC: 15 MMOL/L (ref 10–20)
ANISOCYTOSIS BLD QL SMEAR: (no result) (100X)
AST SERPL-CCNC: 65 U/L (ref 5–34)
BILIRUB SERPL-MCNC: 4.6 MG/DL (ref 0.2–1.2)
BUN SERPL-MCNC: 19 MG/DL (ref 8.4–25.7)
CALCIUM SERPL-MCNC: 8.3 MG/DL (ref 7.8–10.44)
CHLORIDE SERPL-SCNC: 103 MMOL/L (ref 98–107)
CO2 SERPL-SCNC: 20 MMOL/L (ref 22–29)
CREAT CL PREDICTED SERPL C-G-VRATE: 83 ML/MIN (ref 70–130)
GLOBULIN SER CALC-MCNC: 3 G/DL (ref 2.4–3.5)
GLUCOSE SERPL-MCNC: 137 MG/DL (ref 70–105)
HCV RNA SERPL NAA+PROBE-ACNC: (no result) IU/ML
HCV RNA SERPL NAA+PROBE-LOG IU: 6.33 {LOG_IU}/ML
HGB BLD-MCNC: 13.6 G/DL (ref 14–18)
MAGNESIUM SERPL-MCNC: 1.6 MG/DL (ref 1.6–2.6)
MCH RBC QN AUTO: 27.7 PG (ref 27–31)
MCV RBC AUTO: 89.8 FL (ref 78–98)
MDIFF COMPLETE?: YES
PLATELET # BLD AUTO: 89 THOU/UL (ref 130–400)
POTASSIUM SERPL-SCNC: 3.3 MMOL/L (ref 3.5–5.1)
RBC # BLD AUTO: 4.9 MILL/UL (ref 4.7–6.1)
SODIUM SERPL-SCNC: 135 MMOL/L (ref 136–145)
WBC # BLD AUTO: 7.7 THOU/UL (ref 4.8–10.8)

## 2020-07-31 RX ADMIN — Medication SCH: at 20:42

## 2020-07-31 RX ADMIN — Medication SCH ML: at 08:25

## 2020-07-31 RX ADMIN — PANTOPRAZOLE SODIUM SCH MLS: 40 INJECTION, POWDER, FOR SOLUTION INTRAVENOUS at 22:07

## 2020-07-31 RX ADMIN — PANTOPRAZOLE SODIUM SCH MLS: 40 INJECTION, POWDER, FOR SOLUTION INTRAVENOUS at 10:04

## 2020-07-31 NOTE — PDOC.FM
- Subjective


Subjective: 





Patient resting comfortably in bed this AM. Denies any new complaints or 

concerns. Still feels like he has too much fluid on him, as his legs are 

swollen. Able to lay down flat, no SOB, CP, HA, abdominal pain.





- Objective


MAR Reviewed: Yes


Vital Signs & Weight: 


 Vital Signs (12 hours)











  Temp Pulse Resp BP BP Pulse Ox


 


 07/31/20 04:00  97.9 F  113 H  16   101/66  94 L


 


 07/30/20 19:08  98.6 F  114 H  16  126/78   96








 Weight











Weight                         97.069 kg














I&O: 


 











 07/29/20 07/30/20 07/31/20





 06:59 06:59 06:59


 


Intake Total  2200 750


 


Output Total  300 2025


 


Balance  1900 -1275











Result Diagrams: 


 07/31/20 04:14





 07/31/20 04:14





Phys Exam





- Physical Examination


Constitutional: NAD


HEENT: moist MMs, sclera anicteric


Neck: supple, full ROM


Respiratory: no wheezing, clear to auscultation bilateral


Cardiovascular: RRR, no significant murmur


Gastrointestinal: soft, non-tender, positive bowel sounds


Musculoskeletal: pulses present


2+ pitting edema in bilateral lower extremities


Neurological: moves all 4 limbs


Psychiatric: normal affect, A&O x 3


Skin: normal turgor


Deviation from normal: venous stasis dermatitis





Dx/Plan





- Plan


Plan: 





Encephalopathy, resolved


-Likely 2/2 hypoglycemia vs substance abuse


- CT head negative


- monitor BG


- monitor for withdrawal sx


- COVID test neg


- TSH, 4.3357


- UDS positive meth





Acute on chronic HF exacerbation


- Echo 2/20 showing EF 50-55%, severe TR, pulmonary HTN


- IV Lasix 40mg mEq PRN


- on home Lasix


- recheck BNP, trending down





Cholelithiasis


- CT abdomen with concern, f/u US showing cholelithiasis and cholecystitis


- Surgery consulted, appreciate the recs


- HIDA scan negative, non operable. Likely falsely positive hough's sign due 

to painfully tender congested liver





Possible Liver Cirrhosis


- extensive history of drug use, Hx of Hep C


- on admission, AST 73, ALT 28, Platelets 86, prolonged PT/PTT, hypoglycemia


- prophylaxis against SBP: cefepime, flagyl discontinued


-GI consulted, appreciate the recs. Plan to follow up outpatient





Elevated Trops


-denies CP


- trended down





HTN


- Home meds





Pulmonary HTN


- Maintain O2 sats, PRN O2


- consider consult to palliative





HCV


- Monitor LFT


-GI consulted, appreciate the recs. Plan to follow up outpatient.





Skin lesion


- Likely has BCC on back, possible removal/biopsy





Substance Use


-IV meth last use 2 weeks ago


- UDS + for methamphetamines and amphetamines





BEVERLY


-Cr on admission 1.54


- FeNa .2%, pre renal


-continue to monitor








IVF: KVO


DVT ppx: SCD


GI PPX: Protonix gtt


Code: full


Disposition/LOS: inpatient, >48 hours

## 2020-07-31 NOTE — PRG
DATE OF SERVICE:  07/31/2020



SUBJECTIVE:  Mr. Cabral underwent a HIDA scan that was normal, had normal

visualization of the gallbladder __________ GI tract.  There is no evidence of

biliary obstruction.  His gallstones are asymptomatic and his positive sonographic

Tejeda sign reported on his gallbladder ultrasound is a false-positive due to

hepatic congestion from congestive heart failure.  He has had this repeatedly.  The

patient reports some discomfort in his left groin.  CAT scan did not report an

inguinal hernia.  The patient reports some discomfort here.  On exam, he has some

fullness in his left groin and the fullness in his inguinal canal that may be

suggestive of left inguinal hernia.  He is examined standing.  There is no acute

intraabdominal pathology that demands immediate attention.  He may have a left

inguinal hernia.  This was not seen on CAT scan; however.  If surgical intervention

for his inguinal hernia is considered, then he would need preoperative cardiac

clearance and evaluation.  I would recommend Cardiology consult this

hospitalization.  Dr. Rogers is covering this weekend.  I will see him as needed.

The patient can follow up in my office if inguinal hernia repair is necessary in the

future.  I will see him as needed this hospitalization. 







Job ID:  [id number]

## 2020-08-01 LAB
ALBUMIN SERPL BCG-MCNC: 3.3 G/DL (ref 3.5–5)
ALP SERPL-CCNC: 87 U/L (ref 40–110)
ALT SERPL W P-5'-P-CCNC: 31 U/L (ref 8–55)
ANION GAP SERPL CALC-SCNC: 13 MMOL/L (ref 10–20)
ANION GAP SERPL CALC-SCNC: 13 MMOL/L (ref 10–20)
AST SERPL-CCNC: 64 U/L (ref 5–34)
BASOPHILS # BLD AUTO: 0 THOU/UL (ref 0–0.2)
BASOPHILS NFR BLD AUTO: 0.4 % (ref 0–1)
BILIRUB SERPL-MCNC: 4.4 MG/DL (ref 0.2–1.2)
BUN SERPL-MCNC: 18 MG/DL (ref 8.4–25.7)
BUN SERPL-MCNC: 19 MG/DL (ref 8.4–25.7)
CALCIUM SERPL-MCNC: 7.9 MG/DL (ref 7.8–10.44)
CALCIUM SERPL-MCNC: 8.4 MG/DL (ref 7.8–10.44)
CHLORIDE SERPL-SCNC: 101 MMOL/L (ref 98–107)
CHLORIDE SERPL-SCNC: 99 MMOL/L (ref 98–107)
CK MB SERPL-MCNC: 6.5 NG/ML (ref 0–6.6)
CO2 SERPL-SCNC: 22 MMOL/L (ref 22–29)
CO2 SERPL-SCNC: 23 MMOL/L (ref 22–29)
CREAT CL PREDICTED SERPL C-G-VRATE: 87 ML/MIN (ref 70–130)
CREAT CL PREDICTED SERPL C-G-VRATE: 88 ML/MIN (ref 70–130)
EOSINOPHIL # BLD AUTO: 2 THOU/UL (ref 0–0.7)
EOSINOPHIL NFR BLD AUTO: 22 % (ref 0–10)
GLOBULIN SER CALC-MCNC: 3.3 G/DL (ref 2.4–3.5)
GLUCOSE SERPL-MCNC: 106 MG/DL (ref 70–105)
GLUCOSE SERPL-MCNC: 121 MG/DL (ref 70–105)
HGB BLD-MCNC: 13.5 G/DL (ref 14–18)
LYMPHOCYTES # BLD: 1.2 THOU/UL (ref 1.2–3.4)
LYMPHOCYTES NFR BLD AUTO: 13.1 % (ref 21–51)
MAGNESIUM SERPL-MCNC: 1.8 MG/DL (ref 1.6–2.6)
MCH RBC QN AUTO: 27.3 PG (ref 27–31)
MCV RBC AUTO: 88.5 FL (ref 78–98)
MONOCYTES # BLD AUTO: 0.9 THOU/UL (ref 0.11–0.59)
MONOCYTES NFR BLD AUTO: 9.9 % (ref 0–10)
NEUTROPHILS # BLD AUTO: 4.9 THOU/UL (ref 1.4–6.5)
NEUTROPHILS NFR BLD AUTO: 54.6 % (ref 42–75)
PLATELET # BLD AUTO: 82 THOU/UL (ref 130–400)
POTASSIUM SERPL-SCNC: 3.3 MMOL/L (ref 3.5–5.1)
POTASSIUM SERPL-SCNC: 3.4 MMOL/L (ref 3.5–5.1)
RBC # BLD AUTO: 4.93 MILL/UL (ref 4.7–6.1)
SODIUM SERPL-SCNC: 131 MMOL/L (ref 136–145)
SODIUM SERPL-SCNC: 134 MMOL/L (ref 136–145)
TROPONIN I SERPL DL<=0.01 NG/ML-MCNC: 0.05 NG/ML (ref ?–0.03)
WBC # BLD AUTO: 8.9 THOU/UL (ref 4.8–10.8)

## 2020-08-01 RX ADMIN — Medication SCH ML: at 08:07

## 2020-08-01 RX ADMIN — Medication SCH ML: at 21:35

## 2020-08-01 NOTE — CT
EXAM: CTA of the chest and abdomen



HISTORY: Chest pain and back pain; hypotension



COMPARISON: None



TECHNIQUE: Multiple contiguous axial images were obtained a CTA of the chest and abdomen with contras
t per aortic dissection protocol. Sagittal and coronal 3-D MIP reformats were performed.



FINDINGS:

HEART: Enlarged. Small pericardial effusion.

PULMONARY ARTERIES: Normal in caliber without filling defects to suggest pulmonary emboli.

MEDIASTINUM: No hilar or mediastinal lymphadenopathy.

LUNGS: No focal infiltrates or masses.

PLEURAL SPACE: Small bilateral pleural effusions with adjacent atelectasis.



CHEST AND ABDOMINAL WALL SOFT TISSUES: Diffuse soft tissue anasarca.



LIVER: Unremarkable. Trace ascites adjacent to the liver.

GALLBLADDER: Calcified gallstones.

KIDNEYS: Unremarkable.

SPLEEN: Unremarkable.

PANCREAS: Unremarkable.



BOWEL: Scattered diverticula in the colon..

RETROPERITONEUM: No lymphadenopathy

BONES: Degenerative changes in the spine and shoulders.



ASCENDING THORACIC AORTA:  Normal caliber without evidence of dissection or aneurysmal dilatation.

DESCENDING THORACIC AORTA:  Normal caliber without evidence of dissection or aneurysmal dilatation.

ABDOMINAL AORTA: Normal caliber without evidence of dissection or aneurysmal dilatation.

CELIAC TRUNK: Patent

SMA: Patent

DEANA: Patent

RENAL ARTERIES: Bilateral single renal arteries without significant atherosclerotic disease



IMPRESSION:



1. No evidence of thoracic or abdominal aortic aneurysm or dissection

2. Cholelithiasis

3. Diverticulosis

4. Bilateral pleural effusions with adjacent atelectasis

5. Pericardial effusion



Reported By: Azam De 

Electronically Signed:  8/1/2020 11:11 PM

## 2020-08-01 NOTE — PDOC.BPN
- Brief Progress Note





Nurse notified me that on tele, it looked as if patient was having ST changes 

and was tachycardic in the 130s-140s. Patient denies any CP, SOB, HA, changes 

in vision. Stat EKG and troponins ordered, Nitro SL PRN. Patient appeared to be 

in no acute distress. Cardiology consulted.





EKG showed ST changes in leads II, III, aVF, tachycardia


Trop .054, patient's baseline seems to be elevated.

## 2020-08-01 NOTE — PDOC.FM
- Subjective


Subjective: 





Patient states he is doing ok today. Would like to go home to get some rest. 

Endorses SOB when getting up and walking. Still "swollen", but improved. Denies 

CP, HA, vision changes, abdominal pain. Tolerating PO without difficulty. 





- Objective


MAR Reviewed: Yes


Vital Signs & Weight: 


 Vital Signs (12 hours)











  Temp Pulse Resp BP BP Pulse Ox


 


 08/01/20 05:54       100


 


 08/01/20 04:45  97.4 F L  110 H  20  112/78   100


 


 07/31/20 19:50  98.5 F  73  20   122/59 L  94 L








 Weight











Weight                         97.069 kg














I&O: 


 











 07/30/20 07/31/20 08/01/20





 06:59 06:59 06:59


 


Intake Total 2200 1470 668


 


Output Total 300 2650 2300


 


Balance 1900 -9540 1632











Result Diagrams: 


 08/01/20 04:00





 08/01/20 04:00





Phys Exam





- Physical Examination


Constitutional: NAD


HEENT: moist MMs, sclera anicteric


Neck: supple, full ROM


Respiratory: no wheezing, clear to auscultation bilateral


Cardiovascular: RRR, no significant murmur


Gastrointestinal: soft, non-tender, positive bowel sounds


Musculoskeletal: pulses present


2+ pitting edema up to knees, 1+ pitting edema to mid thigh


Neurological: moves all 4 limbs


Psychiatric: normal affect, A&O x 3


Skin: no rash





Dx/Plan





- Plan


Plan: 





Acute on chronic HF exacerbation


- Echo in 2/20 showing EF 50-55%, severe TR, pulmonary HTN


- IV Lasix 


- on home Lasix 10mg BID, patient states he occasionally doubles or triples his 

dose and takes at one time


- BNP elevated on admission, improved on recheck


- consider cardiology consult





Encephalopathy, resolved


-Likely 2/2 hypoglycemia vs substance abuse


- CT head negative


- monitor BG


- monitor for withdrawal sx


- COVID test neg


- TSH, 4.3357


- UDS positive meth





Cholelithiasis


- CT abdomen with concern, f/u US showing cholelithiasis and cholecystitis


- Surgery consulted, appreciate the recs


- HIDA scan negative, non operable. Likely falsely positive hough's sign due 

to painfully tender congested liver





Possible Liver Cirrhosis


- extensive history of drug use, Hx of Hep C


- on admission, AST 73, ALT 28, Platelets 86, prolonged PT/PTT, hypoglycemia


- prophylaxis against SBP: cefepime, flagyl discontinued


-GI consulted, appreciate the recs. Plan to follow up outpatient


- Hep C RNA pending





Possible Inguinal Hernia


-seen by surgeryNick, appreciate the recs


-CT scan didn't show evidence of inguinal hernia


-outpatient management





Elevated Trops


-denies CP


- trended down





HTN


- Home meds





Pulmonary HTN


- Maintain O2 sats, PRN O2


- consider consult to palliative





HCV


- Monitor LFT


-GI consulted, appreciate the recs. Plan to follow up outpatient.





Skin lesion


- Likely has BCC on back, possible removal/biopsy





Substance Use


-IV meth last use 2 weeks ago


- UDS + for methamphetamines and amphetamines





BEVERLY


-Cr on admission 1.54


- FeNa .2%, pre renal


-continue to monitor





COPD


-patient not on home meds


-denies history of smoking


-SpO2 goal 88-92%


-alpha 1 anti trypsin deficiency suspected, levels ordered. pending.








IVF: KVO


DVT ppx: SCD


GI PPX: Protonix gtt discontinued


Code: full


Disposition/LOS: inpatient, >48 hours





Addendum - Attending





- Attending Attestation


Date/Time: 08/01/20 3270





I personally evaluated the patient and discussed the management with Dr. Buckley.


I agree with the History, Examination, Assessment and Plan documented above 

with any addition or exceptions noted below.


The patient has lower extremity edema.  Will continue IV lasix but increase to 

80 mg.  Shortly after rounding on pt, he began having ST changes on the EKG.  

Repeat troponin was indeterminate but similar to previous trops.  Dr. Buckley 

will speak with cardiology.  Pt notes he is feeling fine and wants to go home 

but more diuresis is needed at this time.

## 2020-08-01 NOTE — RAD
EXAM: Single view of the chest



HISTORY:   Chest pain



COMPARISON: 7/28/2020



FINDINGS: Single view of the chest shows an enlarged cardiomediastinal silhouette. There is no eviden
ce of consolidation, mass, or pleural effusion. The bones are unremarkable



IMPRESSION: Cardiomegaly



Reported By: Azam De 

Electronically Signed:  8/1/2020 9:23 PM

## 2020-08-01 NOTE — CON
DATE OF CONSULTATION:  08/01/2020



REASON FOR CONSULTATION:  Abnormal EKG.



HISTORY OF PRESENT ILLNESS:  Mr. Cabral is a pleasant 55-year-old white gentleman,

who comes to the hospital for altered mentation.  This was back on the 28th of July.

 He comes in with a blood sugar of 16, likely cause of altered mental status because

of low sugar.  His sugar was replaced and he did better.  He had some vague

abdominal pain that was thought to be related to cholecystitis.  However, both

Surgery and Gastroenterology have evaluated him and deemed his abdominal issues not

to be surgical.  Biggest concern is that this was related to known right-sided heart

failure and cirrhosis.  He has been diuresed with IV Lasix and is actually doing

much better.  Today this morning, he was being a lot more tachycardic.  Monitor

showed some EKG changes that were consistent with possible ST-elevation so

Cardiology has been consulted for this.  He denied any chest pain, tightness,

pressure.  His only issue was the last night he was getting IV fluids and IV

medications and his IV infiltrated and he has a very painful right upper extremity

with lot of fluid infiltration on his arm and is exquisitely painful for him, so

this is his only complaint at this time. 



PAST MEDICAL HISTORY:  

1. Hep C cirrhosis.

2. Chronic diastolic heart failure.

3. Pulmonary hypertension with cor pulmonale.

4. Systemic arterial blood pressure.

5. Substance abuse.



PAST SURGICAL HISTORY:  

1. Appendectomy.

2. Foot surgery.

3. Tonsillectomy.

4. Normal coronaries and heart catheterization in 2015.



ALLERGIES:  NO KNOWN DRUG ALLERGIES.



OUTPATIENT MEDICATIONS:  

1. Lisinopril 10 mg b.i.d.

2. Lasix 20 mg b.i.d.

3. Aspirin 81 a day.

4. Acetaminophen p.r.n.

5. Tamsulosin.

6. Gabapentin.



FAMILY HISTORY:  Noncontributory.



SOCIAL HISTORY:  Former tobacco user of minimal use, quit 30 years ago.  No alcohol

use, used to have methamphetamine use and cocaine use in the past.  Still uses

marijuana frequently, he states for pain control. 



REVIEW OF SYSTEMS:  A 12-point review of systems was done and was found to be

negative other than stated in the history of present illness. 



PHYSICAL EXAMINATION:

VITAL SIGNS:  Temperature 98.7, pulse 120, respiratory rate 23, saturating 88% on

room air, goes to 92% on 4 L.  blood pressure 120/83. 

GENERAL:  Awake, alert, and oriented x3.  Only complaint is arm pain. 

HEENT:  Normocephalic, atraumatic. 

NECK:  Supple. 

LUNGS:  Reduced breath sounds bilaterally. 

CARDIOVASCULAR:  S1 and S2.  Tachycardic in the 120. 

ABDOMEN:  Soft with positive bowel sounds. 

EXTREMITIES:  2+ edema. 

SKIN:  Warm and dry. 

EXTREMITIES:  Chronic venous insufficiency changes on his bilateral lower

extremities.  Right upper extremity is erythematous and very tender to touch. 



LABORATORY DATA:  Laboratory work was reviewed.  Most recently, chemistry with

sodium of 134, potassium was 3.4, otherwise unremarkable except for a total

bilirubin of 4.4, AST was 64.  Troponin was drawn today after the event at 0.054.

On admission, it was 0.04, 0.05 and 0.04, so this is consistent with his chronic

troponin elevation.  UA was reviewed.  Toxicology was reviewed.  Amphetamines and

methamphetamines were detected on admission. 



EKG was reviewed.  No ST-elevation is seen on EKG.



ASSESSMENT:  

1. EKG changes.  He does have some runs of nonsustained SVT.  However, EKGs not

indicative of an ST-elevation myocardial infarction.  He is completely asymptomatic

from that standpoint. 

2. Severe pulmonary hypertension.

3. Hep C cirrhosis.

4. Volume overload.

5. Right upper extremity pain.  Likely this is from the fluid from infiltration of

the vein.  We will have to keep an eye on this to make sure it does not get infected

and does not become cellulitis. 



PLAN:  

1. Agree with IV Lasix at this time at his current dose.

2. He looks a lot better and his only complaint is his arm pain, which is probably

the reason why his heart is so tachycardia. 

Thank you for letting us participate in the care of this patient.  We will follow.







Job ID:  [id number]

## 2020-08-01 NOTE — PRG
DATE OF SERVICE:  07/31/2020



ADDENDUM:  This is an addendum to the note of Dr. Latanya Buckley. 



Mr. Cabral is in good spirits this morning, in no distress.  His edema is gradually

resolving.  Interestingly, although Mr. Cabral has strong evidence of pulmonary

hypertension with an echo with pulmonary artery pressure of 65, as well as

peripheral edema suggestive of cor pulmonale, he has never smoked.  He has never

worked in heavy industry around fumes, dust, etc.  He also has a family history of

COPD, suggesting the possibility of alpha-1 antitrypsin deficiency for which we will

screen him.  He also snores heavily at night and DUNG is a consideration.  He has

numerous other problems including hepatitis C.  He is nearing time for discharge and

these other diagnostic possibilities will be pursued as an outpatient. 







Job ID:  [id number]

## 2020-08-02 VITALS — TEMPERATURE: 97.6 F | SYSTOLIC BLOOD PRESSURE: 108 MMHG | DIASTOLIC BLOOD PRESSURE: 74 MMHG

## 2020-08-02 LAB
ALBUMIN SERPL BCG-MCNC: 3.3 G/DL (ref 3.5–5)
ALP SERPL-CCNC: 82 U/L (ref 40–110)
ALT SERPL W P-5'-P-CCNC: 31 U/L (ref 8–55)
ANION GAP SERPL CALC-SCNC: 17 MMOL/L (ref 10–20)
ANISOCYTOSIS BLD QL SMEAR: (no result) (100X)
AST SERPL-CCNC: 71 U/L (ref 5–34)
BASOPHILS # BLD AUTO: 0.1 THOU/UL (ref 0–0.2)
BASOPHILS NFR BLD AUTO: 1 % (ref 0–1)
BILIRUB SERPL-MCNC: 3.9 MG/DL (ref 0.2–1.2)
BUN SERPL-MCNC: 20 MG/DL (ref 8.4–25.7)
CALCIUM SERPL-MCNC: 8 MG/DL (ref 7.8–10.44)
CHLORIDE SERPL-SCNC: 102 MMOL/L (ref 98–107)
CK MB SERPL-MCNC: 8.7 NG/ML (ref 0–6.6)
CO2 SERPL-SCNC: 15 MMOL/L (ref 22–29)
CREAT CL PREDICTED SERPL C-G-VRATE: 90 ML/MIN (ref 70–130)
EOSINOPHIL # BLD AUTO: 1.5 THOU/UL (ref 0–0.7)
EOSINOPHIL NFR BLD AUTO: 18.8 % (ref 0–10)
GLOBULIN SER CALC-MCNC: 2.8 G/DL (ref 2.4–3.5)
GLUCOSE SERPL-MCNC: 99 MG/DL (ref 70–105)
HGB BLD-MCNC: 13.2 G/DL (ref 14–18)
LYMPHOCYTES # BLD: 1.2 THOU/UL (ref 1.2–3.4)
LYMPHOCYTES NFR BLD AUTO: 14.7 % (ref 21–51)
MCH RBC QN AUTO: 27.3 PG (ref 27–31)
MCV RBC AUTO: 87.8 FL (ref 78–98)
MDIFF COMPLETE?: YES
MONOCYTES # BLD AUTO: 0.8 THOU/UL (ref 0.11–0.59)
MONOCYTES NFR BLD AUTO: 9.9 % (ref 0–10)
NEUTROPHILS # BLD AUTO: 4.5 THOU/UL (ref 1.4–6.5)
NEUTROPHILS NFR BLD AUTO: 55.8 % (ref 42–75)
PLATELET # BLD AUTO: 78 THOU/UL (ref 130–400)
POTASSIUM SERPL-SCNC: 3.8 MMOL/L (ref 3.5–5.1)
RBC # BLD AUTO: 4.86 MILL/UL (ref 4.7–6.1)
SODIUM SERPL-SCNC: 130 MMOL/L (ref 136–145)
WBC # BLD AUTO: 8.1 THOU/UL (ref 4.8–10.8)

## 2020-08-02 RX ADMIN — Medication SCH: at 21:31

## 2020-08-02 RX ADMIN — Medication SCH ML: at 08:46

## 2020-08-02 RX ADMIN — Medication PRN LOZ: at 10:52

## 2020-08-02 RX ADMIN — Medication PRN LOZ: at 13:06

## 2020-08-02 NOTE — PDOC.CPN
- Subjective


Date: 08/02/20


Time: 17:30


Interval history: 





His only complaint is arm pain. No chest pain. Breathing close to baseline. 





- Review of Systems


General: reports: fatigue.  denies: fever/chills, weight/appetite/sleep changes

, night sweats


Respiratory: reports: cough, shortness of breath.  denies: congestion, exercise 

intolerance


Cardiovascular: denies: chest pain, palpitation, edema, paroxysmal nocturnal 

dyspnea, orthopnea


Gastrointestinal: denies: nausea, vomiting, diarrhea, constipation, abd pain, 

GI bleeding


Musculoskeletal: denies: pain, tenderness, stiffness, swelling, arthritis/

arthralgias


Neurological: denies: numbness, syncope, seizure, weakness





- Objective


Allergies/Adverse Reactions: 


 Allergies











Allergy/AdvReac Type Severity Reaction Status Date / Time


 


No Known Drug Allergies Allergy   Verified 02/14/20 20:40











Visit Medications: 


 Current Medications





Albuterol/Ipratropium (Duoneb)  3 ml NEB [id number] PRN


   PRN Reason: SOB &/or Wheezing


   Last Admin: 08/02/20 07:32 Dose:  3 ml


Atorvastatin Calcium (Lipitor)  40 mg PO HS [facility]


   Last Admin: 08/01/20 21:39 Dose:  40 mg


Calcium Carbonate (Tums)  1,000 mg PO Q4H PRN


   PRN Reason: Heartburn  or Indigestion


Dextrose/Water (Dextrose 50%)  25 gm SLOW IVP PRN PRN


   PRN Reason: Hypoglycemia


Enoxaparin Sodium (Lovenox)  95 mg SC 0900,2100 [facility]


Furosemide (Lasix)  80 mg SLOW IVP DAILY [facility]


   Last Admin: 08/02/20 08:46 Dose:  80 mg


Gabapentin (Neurontin)  300 mg PO TID [facility]


   Last Admin: 08/02/20 15:21 Dose:  300 mg


Glucagon (Glucagon)  1 mg IM PRN PRN


   PRN Reason: Hypoglycemia


Guaifenesin (Mucinex)  600 mg PO Q12HR [facility]


   Last Admin: 08/02/20 08:50 Dose:  600 mg


Dextrose/Water (D5w)  1,000 mls @ 0 mls/hr IV .Q0M PRN


   PRN Reason: Hypoglycemia


Ibuprofen (Motrin)  600 mg PO Q6H PRN


   PRN Reason: Pain


   Last Admin: 08/02/20 11:05 Dose:  600 mg


Nitroglycerin (Nitrostat)  0.4 mg SL Q5MIN PRN


   PRN Reason: Chest Pain


Ondansetron HCl (Zofran Odt)  4 mg PO Q6H PRN


   PRN Reason: Nausea/Vomiting


Ondansetron HCl (Zofran)  4 mg IVP Q6H PRN


   PRN Reason: Nausea/Vomiting


Pantoprazole Sodium (Protonix)  40 mg PO DAILY [facility]


   Last Admin: 08/02/20 08:49 Dose:  40 mg


Potassium Chloride (K-Dur)  40 meq PO QAM-WM [facility]


   Last Admin: 08/02/20 08:49 Dose:  40 meq


Sodium Chloride (Flush - Normal Saline)  10 ml IVF Q12HR [facility]


   Last Admin: 08/02/20 08:46 Dose:  10 ml


Sodium Chloride (Flush - Normal Saline)  10 ml IVF PRN PRN


   PRN Reason: Saline Flush


Tamsulosin HCl (Flomax)  0.4 mg PO DAILY [facility]


   Last Admin: 08/02/20 08:49 Dose:  0.4 mg


Throat Lozenges (Cepastat Lozenges)  1 freddy PO Q2H PRN


   PRN Reason: Cough


   Last Admin: 08/02/20 13:06 Dose:  1 freddy








Vital Signs & Weight: 


 Vital Signs











  Temp Pulse Resp BP BP BP Pulse Ox


 


 08/02/20 16:00   110 H  18  101/75    92 L


 


 08/02/20 10:58  98 F  114 H  18    109/80  94 L


 


 08/02/20 08:50        92 L


 


 08/02/20 07:59  98.2 F  107 H  18   108/82   92 L


 


 08/02/20 07:32   104 H  12    


 


 08/02/20 05:34        93 L








 











Weight                         210 lb 11.2 oz

















- Physical Exam


General: alert & oriented x3


HEENT: mucus membranes moist


Neck: supple neck


Cardiac: regular rate and rhythm


Lungs: decreased breath sounds


Neuro: grossly intact


Abdomen: active bowel sounds


Extremities: no edema


Skin: clear


Musculoskeletal: no pain





- Labs


Result Diagrams: 


 08/02/20 04:17





 08/02/20 04:17


 Troponin/CKMB











CK-MB (CK-2)  8.7 ng/mL (0-6.6)  H*  08/02/20  00:32    


 


Troponin I  0.068 ng/mL (< 0.028)  H  08/02/20  00:32    














- Telemetry


Sinus rhythms and dysrhythmias: sinus rhythm





- Assessment/Plan


Assessment/Plan: 





1. EKG changes. NO STEMI. 


2. Severe pulmonary hypertension.


3. Hep C cirrhosis.


4. Right upper extremity venous thrombosis. 





PLAN:


- From volume perspective he seems dry. 


- Would put back on home dose of diuretic. 


- Cephalic vein thrombosis is considered a superficial vein and may or may not 

be treated with full anticoagulation. Given his high risk for pulmonary HTN 

worsening with any level of thrombosis to lungs and how catastrophic this would 

be I would err on the side of caution and start full anticoagulation for at 

least 3-6 months and treat as a DVT.

## 2020-08-02 NOTE — PDOC.EVN
Event Note





- Event Note


Event Note: 





Spoke with patient after residents informed me he was considering leaving AM. 

The patient started on conversation with "I was advised by my  not to 

talk to you but I am going to talk to you anyway, just know that I am recording 

this conversation." Patient states he does not feel he is receiving appropriate 

care here, feel that our team caused the DVT in his arm, and wants to be 

transferred to another facility. I informed him his insurance would not approve 

of a lateral transfer as he does not require a higher level of care at this 

time. He stated he then wanted to be discharged so he could drive to another 

facility. I told him that in his current condition, I could not discharge him 

as he is not medically stable for discharge. He then asked if I was holding him 

in the hospital against his will. I informed him I was not holding him against 

his will, I just would not discharge him and if he wanted to leave he would 

need to sign out against medical advise. He stated his wife was already on her 

way to the hospital and that he would be leaving to drive towards [place] and 

check in to a "private hospital." He acknowledged that he was ill and needed 

inpatient medical care but that he wanted to receive care elsewhere. I again 

reiterated that leaving the hospital could result in decompensation of his 

condition and could lead to further complications but noted that he had already 

acknowledged this. He said he was leaving AM. I told him I would let his nurse 

know and she would have the paperwork available as soon as his wife arrived at 

the hospital. Will continue to follow up with him while he is in the hospital.

## 2020-08-02 NOTE — ULT
RIGHT UPPER EXTREMITY VENOUS DOPPLER:

 

Date:  08/02/2020

 

PROVIDED CLINICAL HISTORY:   

Right arm pain and swelling. 

 

FINDINGS:

Gray scale and color Doppler sonography with spectral analysis was performed of the right internal ju
gular, subclavian, axillary, cephalic, basilic, brachial, radial, and ulnar veins. 

 

There is luminal echogenicity and noncompressibility involving the right cephalic vein extending from
 the antecubital fossa into the mid upper and mid lower arm. 

 

The remainder of the interrogated venous structures demonstrate an otherwise normal sonographic appea
vane. 

 

IMPRESSION: 

Venous thrombosis involving the right cephalic vein as described. 

 

 

POS: CORRINA

## 2020-08-02 NOTE — PDOC.FM
- Subjective


Subjective: 





Overnight, patient had an episode of 10/10 acute chest pain radiating to his 

back with accompanying shortness of breath. SBPs in the 80s per nurse, then 

went up to 107 systolic with Trendelenburg, HR in the 110s.  EKG showed non 

specific ST changes, per cardiology no significant changes. Cardiology was 

consulted. CTA negative for aortic dissection.





Today, patient complaints of SOB and feeling "tight" from being fluid 

overloaded. Patient also endorses right arm pain. He states his IV infiltrated 

yesterday early afternoon, but says now his whole arm is swollen and it hurts 

to move his fingers and arm. Denies HA, CP, abdominal pain, changes in vision.





- Objective


MAR Reviewed: Yes


Vital Signs & Weight: 


 Vital Signs (12 hours)











  Temp Pulse Resp BP Pulse Ox


 


 08/02/20 05:34      93 L


 


 08/01/20 20:08      93 L


 


 08/01/20 20:00  97.7 F  114 H  15  88/58 L  93 L








 Weight











Weight                         96.434 kg














I&O: 


 











 07/31/20 08/01/20 08/02/20





 06:59 06:59 06:59


 


Intake Total 1470 1248 720


 


Output Total 2650 3225 1500


 


Balance -1180 -1977 -780











Result Diagrams: 


 08/02/20 04:17





 08/02/20 04:17





Phys Exam





- Physical Examination


Constitutional: NAD


diffusely edematous


HEENT: PERRLA, sclera anicteric


Neck: supple, full ROM


Respiratory: no wheezing, clear to auscultation bilateral


Cardiovascular: no significant murmur


regular rhythum, tachycardic


Gastrointestinal: non-tender, positive bowel sounds


slightly distended abdomen


Musculoskeletal: pulses present


3+ pitting edema in BLE. Swelling in right upper extremity down to hand.


Neurological: moves all 4 limbs


Psychiatric: normal affect, A&O x 3


Deviation from normal: ecchymosis forming on right hand. Erythema where IV 

infiltrated R arm





Dx/Plan





- Plan


Plan: 





Chronic diastolic HF


- hx of HFpEF


- Echo in 2/20 showing EF 50-55%, severe TR, pulmonary HTN


- IV Lasix 80mg, will consult Cardiology about recs for diuresing in setting of 

right heart failure with his low pressures


- on home Lasix 10mg BID, patient states he occasionally doubles or triples his 

dose and takes at one time


- BNP elevated on admission, improved on recheck


- Cardiology consulted, appreciate the recs





Encephalopathy, resolved


-Likely 2/2 hypoglycemia vs substance abuse


- CT head negative


- monitor BG


- monitor for withdrawal sx


- COVID test neg


- TSH, 4.3357


- UDS positive meth





Right upper extremity swelling


-US to r/o DVT


-IV in right arm infiltrated yesterday, redness at site. Swelling down right 

arm with pain and tenderness





Cholelithiasis


- CT abdomen with concern, f/u US showing cholelithiasis and cholecystitis


- Surgery consulted, appreciate the recs


- HIDA scan negative, non operable. Likely falsely positive hough's sign due 

to painfully tender congested liver





Possible Liver Cirrhosis


- extensive history of drug use, Hx of Hep C


- on admission, AST 73, ALT 28, Platelets 86, prolonged PT/PTT, hypoglycemia


- prophylaxis against SBP: cefepime, flagyl discontinued


-GI consulted, appreciate the recs. Plan to follow up outpatient


- Hep C RNA pending





Possible Inguinal Hernia


-seen by surgery, Nick, appreciate the recs


-CT scan didn't show evidence of inguinal hernia


-outpatient management





Elevated Trops


-denies CP


- trended down





HTN


- Home meds





Pulmonary HTN


- cor pulmonale


- Maintain O2 sats, PRN O2


- consider consult to palliative





HCV


- Monitor LFT


-GI consulted, appreciate the recs.


- Plan to follow up outpatient.





Skin lesion


- Likely has BCC on back, possible removal/biopsy





Substance Use


-IV meth last use 2 weeks ago


- UDS + for methamphetamines and amphetamines





BEVERLY, improving


-Cr on admission 1.54


- baseline seems to be >1.0, between 1.1 and 1.3.


- FeNa .2%, pre renal


-continue to monitor





COPD


-patient not on home meds


-denies history of smoking


-SpO2 goal 88-92%


-duonebs PRN


-alpha 1 anti trypsin deficiency suspected, levels ordered. pending.








IVF: KVO


DVT ppx: SCD


Diet: Low sodium Renal diet 1200mL fluid restriction


GI PPX: Protonix gtt discontinued


Code: full


Disposition/LOS: inpatient, >48 hours





Addendum - Attending





- Attending Attestation


Date/Time: 08/02/20 2471





I personally evaluated the patient and discussed the management with Dr. Buckley.


I agree with the History, Examination, Assessment and Plan documented above 

with any addition or exceptions noted below.


The patient is diffusely edematous.  Pt has pain, redness and swelling in the 

right upper extremity.  Will get u/s to rule out dvt.  Overnight pt had chest 

pain radiating to the back.  CXR showed a wide mediastinum.  CTA was negative 

for dissection but pleural effusions and pericardial effusions noted.  CKMB 

elevated today.  Pt's blood pressure is unable to tolerate nitroglycerin.  He 

needs additional diuresis.  Will discuss plan with Dr. Whitley.  Considering 

dobutamine to help with diuresis if blood pressures don't improve.

## 2020-08-02 NOTE — PDOC.BPN
- Brief Progress Note


Paged by nurse regarding new onset chest pain in patient. Went to patient 

bedside, patient reported acute 10/10 central sharp chest pain radiating to his 

back with accompanying shortness of breath. SBPs in the 80s per nurse, then 

went up to 107 systolic with Trendelenburg, HR in the 110s.  EKG showed non 

specific ST changes, per cardiology no significant changes. Heart sounds tachy 

in the room, lungs CTAB. Ordered Stat CXR,  Troponin and BMP. Discussed case 

with Dr. Whitley who recommended STAT CTA Chest and 250 NS IVFs.

## 2020-08-04 NOTE — PQF
CLINICAL DOCUMENTATION CLARIFICATION FORM: 



Dear : Steve Holbrook                   Date / Time: 08/04/2020 0714

Please exercise your independent, professional judgment in responding to the 
clarification form. 

Clinical indicators are provided on the bottom of this form for your review



Based on your clinical judgment, can you please clarify the etiology of the 
abdominal pain?

Please check appropriate box(es):



[  x] hepatomegaly due to liver cirrhosis 

[  ] hepatomegaly due to hepatosteatosis 

[ x ] Right heart failure

[  ] Hypoglycemia

[  ] Pulmonary hypertension 

[  ] Other diagnosis ___________

[  ] Unable to determine

Physician Signature:             Date/Time:



For continuity of documentation, please document condition throughout progress 
notes and discharge summary.  Thank You.



To be completed by CDI/Coding staff for physician review: 







 Present Clinical Indicators - Signs / Symptoms / Labs Results and Location in 
Medical Record

 

[ X ] /81, Pulse 109, Resp 20, Temp 97.9 Vital signs 7/28

 

[ X ] Surgery and Gastroenterology have evaluated him and demed his abdominal 
issues not to be surgical. Biggest concern is that this was related to known 
right-sided heart failure and cirrhosis Consult p1 Dr. Gloria 08/01

 

[ X ] reports left lower quadrant abdominal pain persisting intermittently for 
2 weeks H&P p1 Dr. Castaneda 07/28

 

[ X ] Sonographic evidence of cholelithiasis and cholecystitis Abdomen 
Ultrasound p1 Dr. Nunez 07/28

 

[ X ] No evidence of biliary obstruction Hepatobiliary Scan Nuclear Medicine 
p1 Dr. Madison 07/29

 

[ X ] suspect his current sonographic positive Tejeda sign is a false positive
 Consult p3 Dr. Romero 07/29

 

[ X ] Surgery was consulted and felt that his pain was due to hepatomegaly 
secondary to cor pulmonale PN p1 Dr. Aaron 07/29

 

[ X ] AST 69 (7/28); 73 (7/29); 73 (7/30); 65 (7/31); 64 (8/1); 71 (8/2) 
Laboratory, Chemistry

 

[ X ] ALT 24 (7/28); 28 (7/29); 29 (7/30); 28 (7/31); 31 (8/1); 31 (8/2) 
Laboratory, Chemistry

 

Present Risk Factors                                                           
               Results and Location in Medical Record

 

[ X ] Hepatitis C H&P Dr. Castaneda 07/28

 

[ X ] Cirrhosis H&P Dr. Castaneda 07/28

 

[ X ] Hepatic Steatosis H&P Dr. Castaneda 07/28

 

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 Castaneda 07/28

 

[ X ] Liver Failure H&P Dr. Castaneda 07/28

 

[ X ] Coagulopathy 2/2 Liver disease H&P Dr. Casatneda 07/28

 

[ X ] Peptic ulcer disease HP 07/28

 

[ X ] Cholelithiasis with cholecysitis PN 07/29

 

[ X ] Inguinal Hernia PN 08/02

 

Present Treatments                                                             
                 Results and Location in Medical Record

 

[ X ] Abdomen Ultrasound Ultrasound Dr. Nunez 07/28

 

[ X ] Hepatobiliary Scan Nuclear Medicine Imaging Dr. Madison 07/29

 

[ X ] Gastroenterology Consult Consult Dr. Bruno 07/29

 

[ X ] IV Lasix 40 mg MAR 07/30

 

[ X ] IV Cefepime MAR 07/29

 

[ X ] IV Metronidazole MAR 07/29





CDS/ Signature: Shabnam Reddy        Phone #: [phone]      Date/Time: 08/04/20 0714



 This is a permanent part of the Medical Record
[facility]